# Patient Record
Sex: FEMALE | Race: BLACK OR AFRICAN AMERICAN | Employment: PART TIME | URBAN - METROPOLITAN AREA
[De-identification: names, ages, dates, MRNs, and addresses within clinical notes are randomized per-mention and may not be internally consistent; named-entity substitution may affect disease eponyms.]

---

## 2018-03-12 ENCOUNTER — OFFICE VISIT (OUTPATIENT)
Dept: GYNECOLOGY | Age: 58
End: 2018-03-12

## 2018-03-12 VITALS
BODY MASS INDEX: 30.76 KG/M2 | DIASTOLIC BLOOD PRESSURE: 88 MMHG | HEIGHT: 59 IN | WEIGHT: 152.6 LBS | SYSTOLIC BLOOD PRESSURE: 142 MMHG | HEART RATE: 85 BPM

## 2018-03-12 DIAGNOSIS — C54.1 ENDOMETRIAL CANCER (HCC): Primary | ICD-10-CM

## 2018-03-12 RX ORDER — SERTRALINE HYDROCHLORIDE 100 MG/1
TABLET, FILM COATED ORAL
COMMUNITY

## 2018-03-12 RX ORDER — PRAVASTATIN SODIUM 10 MG/1
TABLET ORAL
COMMUNITY

## 2018-03-12 NOTE — PROGRESS NOTES
[address] [place] [address] Mathias Moritz 773, [address] [facility]  P [phone]  F [phone]    Office Note  Patient ID:  Name:  Mar Jacques  MRN:  [id number]  :  1960/57 y.o. Date:  3/12/2018      HISTORY OF PRESENT ILLNESS:  Mar Jacques is a 56 y.o.  postmenopausal female who is being seen for a new diagnosis of endometrial cancer. She is referred by Dr. Silva Santa in [facility]. She presented for evaluation of postmenopausal bleeding. An ultrasound demonstrated a thickened endometrium. A subsequent biopsy revealed a high-grade endometrial carcinoma with both papillary serous and clear cell features. I have been asked to see her in consultation for further evaluation and management. She reports a two month history of bleeding. ROS:   and GI review:  Negative  Cardiopulmonary review:  Negative   Musculoskeletal:  Negative    A comprehensive review of systems was negative except for that written in the History of Present Illness. , 10 point ROS      OB/GYN ROS:    Vaginal delivery x 3  There is no history of significant gyn problems or procedures. Patient denies any abnormal bleeding or vaginal discharge. Problem List:  Patient Active Problem List    Diagnosis Date Noted    [facility]     PMH:  Past Medical History:   Diagnosis Date    High cholesterol     Kidney stones       PSH:  Past Surgical History:   Procedure Laterality Date    HX BREAST LUMPECTOMY      bilateral lump removal    HX TONSILLECTOMY        Social History:  Social History   Substance Use Topics    Smoking status: Never Smoker    Smokeless tobacco: Never Used    Alcohol use Not on file      Family History:  Family History   Problem Relation Age of Onset    Prostate Cancer Father       Medications: (reviewed)  Current Outpatient Prescriptions   Medication Sig    pravastatin (PRAVACHOL) 10 mg tablet Take  by mouth nightly.  sertraline (ZOLOFT) 100 mg tablet Take  by mouth daily. No current facility-administered medications for this visit. Allergies: (reviewed)  No Known Allergies       OBJECTIVE:    Physical Exam:  VITAL SIGNS: Vitals:    03/12/18 1303   BP: 142/88   Pulse: 85   Weight: 152 lb 9.6 oz (69.2 kg)   Height: 4' 11\" (1.499 m)     Body mass index is 30.82 kg/(m^2). GENERAL RUSS: Conversant, alert, oriented. No acute distress. HEENT: HEENT. No thyroid enlargement. No JVD. Neck: Supple without restrictions. RESPIRATORY: Clear to auscultation and percussion to the bases. No CVAT. CARDIOVASC: RRR without murmur/rub. GASTROINT: soft, non-tender, without masses or organomegaly   MUSCULOSKEL: no joint tenderness, deformity or swelling   EXTREMITIES: extremities normal, atraumatic, no cyanosis or edema   PELVIC: Vulva and vagina appear normal. Bimanual exam reveals normal uterus and adnexa. RECTAL: Deferred   GURMEET SURVEY: No suspicious lymphadenopathy or edema noted. NEURO: Grossly intact. No acute deficit. Imaging: Outside pelvic ultrasound from [facility] reviewed. Uterus measured 9.1 x 3.9 x 4.0 cm. Focal echogenic thickening of the mid to fundal aspect of the endometrial echo complex measuring 14 mm. Trace cystic focus. Ovaries not visualized. No free fluid. No adnexal masses. IMPRESSION/PLAN:  Leo Ruiz is a 56 y.o. female with a working diagnosis of endometrial carcinoma, high-grade with both papillary serous and clear cell features present. I reviewed with Leo Ruiz her medical records, physical exam, and review of symptoms. I explained to her and her family the standard of care for managing endometrial cancer and I discussed the particular subtypes noted on her endometrial biopsy. I have recommended robotic assisted laparoscopic hysterectomy with bilateral salpingooophorectomy, sentinel pelvic lymph node dissection, and omentectomy.   She was counseled on the risks, benefits, indications, and alternatives of surgery. Her questions were answered and she wishes to proceed as planned. We will check a CT of the chest/abdomen/pelvis prior to surgery to rule out metastatic disease. We will also check a CA-125.           Signed By: Conda Dakins., MD     3/12/2018/1:03 PM

## 2018-03-12 NOTE — LETTER
3/12/2018 1:41 PM 
 
Patient:  Lucien Connors YOB: 1960 Date of Visit: 3/12/2018 Dear Dr. Sweetie Barroso VIA Facsimile: [phone] 
 : Thank you for referring Ms. Lucien Connors to me for evaluation/treatment. Below are the relevant portions of my assessment and plan of care. [address], [address] [facility], [address] 
P [phone]  F [phone] Office Note Patient ID: 
Name:  Lucien Connors MRN:  [id number] :  1960/57 y.o. Date:  3/12/2018 HISTORY OF PRESENT ILLNESS: 
Lucien Connors is a 56 y.o.  postmenopausal female who is being seen for a new diagnosis of endometrial cancer. She is referred by Dr. Sweetie Barroso in [facility]. She presented for evaluation of postmenopausal bleeding. An ultrasound demonstrated a thickened endometrium. A subsequent biopsy revealed a high-grade endometrial carcinoma with both papillary serous and clear cell features. I have been asked to see her in consultation for further evaluation and management. She reports a two month history of bleeding. ROS: 
 and GI review:  Negative Cardiopulmonary review:  Negative Musculoskeletal:  Negative A comprehensive review of systems was negative except for that written in the History of Present Illness. , 10 point ROS 
 
 
OB/GYN ROS: 
 Vaginal delivery x 3 There is no history of significant gyn problems or procedures. Patient denies any abnormal bleeding or vaginal discharge. Problem List: 
Patient Active Problem List  
 Diagnosis Date Noted  Endometrial cancer ([address] 2018 PMH: 
Past Medical History:  
Diagnosis Date  High cholesterol  Kidney stones PSH: 
Past Surgical History:  
Procedure Laterality Date  HX BREAST LUMPECTOMY    
 bilateral lump removal  
 HX TONSILLECTOMY Social History: 
Social History Substance Use Topics  Smoking status: Never Smoker  Smokeless tobacco: Never Used  Alcohol use Not on file Family History: 
Family History Problem Relation Age of Onset  Prostate Cancer Father Medications: (reviewed) Current Outpatient Prescriptions Medication Sig  pravastatin (PRAVACHOL) 10 mg tablet Take  by mouth nightly.  sertraline (ZOLOFT) 100 mg tablet Take  by mouth daily. No current facility-administered medications for this visit. Allergies: (reviewed) No Known Allergies OBJECTIVE: 
 
Physical Exam: VITAL SIGNS: Vitals:  
 03/12/18 1303 BP: 142/88 Pulse: 85 Weight: 152 lb 9.6 oz (69.2 kg) Height: 4' 11\" (1.499 m) Body mass index is 30.82 kg/(m^2). GENERAL RUSS: Conversant, alert, oriented. No acute distress. HEENT: HEENT. No thyroid enlargement. No JVD. Neck: Supple without restrictions. RESPIRATORY: Clear to auscultation and percussion to the bases. No CVAT. CARDIOVASC: RRR without murmur/rub. GASTROINT: soft, non-tender, without masses or organomegaly MUSCULOSKEL: no joint tenderness, deformity or swelling EXTREMITIES: extremities normal, atraumatic, no cyanosis or edema PELVIC: Vulva and vagina appear normal. Bimanual exam reveals normal uterus and adnexa. RECTAL: Deferred GURMEET SURVEY: No suspicious lymphadenopathy or edema noted. NEURO: Grossly intact. No acute deficit. Imaging: Outside pelvic ultrasound from [address] reviewed. Uterus measured 9.1 x 3.9 x 4.0 cm. Focal echogenic thickening of the mid to fundal aspect of the endometrial echo complex measuring 14 mm. Trace cystic focus. Ovaries not visualized. No free fluid. No adnexal masses. IMPRESSION/PLAN: 
Mahsa Lynch is a 56 y.o. female with a working diagnosis of endometrial carcinoma, high-grade with both papillary serous and clear cell features present.   I reviewed with Mahsa Lynch her medical records, physical exam, and review of symptoms. I explained to her and her family the standard of care for managing endometrial cancer and I discussed the particular subtypes noted on her endometrial biopsy. I have recommended robotic assisted laparoscopic hysterectomy with bilateral salpingooophorectomy, sentinel pelvic lymph node dissection, and omentectomy. She was counseled on the risks, benefits, indications, and alternatives of surgery. Her questions were answered and she wishes to proceed as planned. We will check a CT of the chest/abdomen/pelvis prior to surgery to rule out metastatic disease. We will also check a CA-125. Signed By: Cherry Martínez MD   
 3/12/2018/1:03 PM  
 
 
New pt, referred by Dr. Tawny Najjar, Endometrial Cancer, Endo bx on 3/5/18 If you have questions, please do not hesitate to call me. I look forward to following Ms. Tito Pimentel along with you.  
 
 
 
Sincerely, 
 
 
Cherry Martínez MD

## 2018-03-16 ENCOUNTER — HOSPITAL ENCOUNTER (OUTPATIENT)
Dept: PREADMISSION TESTING | Age: 58
Discharge: HOME | End: 2018-03-16
Attending: OBSTETRICS & GYNECOLOGY
Payer: OTHER GOVERNMENT

## 2018-03-16 ENCOUNTER — HOSPITAL ENCOUNTER (OUTPATIENT)
Dept: CT IMAGING | Age: 58
Discharge: HOME | End: 2018-03-16
Attending: OBSTETRICS & GYNECOLOGY
Payer: OTHER GOVERNMENT

## 2018-03-16 VITALS
HEIGHT: 59 IN | WEIGHT: 151.38 LBS | DIASTOLIC BLOOD PRESSURE: 85 MMHG | TEMPERATURE: 98.1 F | RESPIRATION RATE: 18 BRPM | BODY MASS INDEX: 30.52 KG/M2 | SYSTOLIC BLOOD PRESSURE: 140 MMHG | HEART RATE: 64 BPM

## 2018-03-16 DIAGNOSIS — C54.1 ENDOMETRIAL CANCER (HCC): ICD-10-CM

## 2018-03-16 LAB
ALBUMIN SERPL-MCNC: 4.1 G/DL (ref 3.5–5)
ALBUMIN/GLOB SERPL: 1.2 {RATIO} (ref 1.1–2.2)
ALP SERPL-CCNC: 119 U/L (ref 45–117)
ALT SERPL-CCNC: 24 U/L (ref 12–78)
ANION GAP SERPL CALC-SCNC: 8 MMOL/L (ref 5–15)
AST SERPL-CCNC: 18 U/L (ref 15–37)
BASOPHILS # BLD: 0 K/UL (ref 0–0.1)
BASOPHILS NFR BLD: 0 % (ref 0–1)
BILIRUB SERPL-MCNC: 0.3 MG/DL (ref 0.2–1)
BUN SERPL-MCNC: 13 MG/DL (ref 6–20)
BUN/CREAT SERPL: 16 (ref 12–20)
CALCIUM SERPL-MCNC: 9.3 MG/DL (ref 8.5–10.1)
CHLORIDE SERPL-SCNC: 107 MMOL/L (ref 97–108)
CO2 SERPL-SCNC: 25 MMOL/L (ref 21–32)
CREAT SERPL-MCNC: 0.79 MG/DL (ref 0.55–1.02)
DIFFERENTIAL METHOD BLD: NORMAL
EOSINOPHIL # BLD: 0 K/UL (ref 0–0.4)
EOSINOPHIL NFR BLD: 0 % (ref 0–7)
ERYTHROCYTE [DISTWIDTH] IN BLOOD BY AUTOMATED COUNT: 14.5 % (ref 11.5–14.5)
GLOBULIN SER CALC-MCNC: 3.5 G/DL (ref 2–4)
GLUCOSE SERPL-MCNC: 83 MG/DL (ref 65–100)
HCT VFR BLD AUTO: 39.8 % (ref 35–47)
HGB BLD-MCNC: 13.7 G/DL (ref 11.5–16)
IMM GRANULOCYTES # BLD: 0 K/UL (ref 0–0.04)
IMM GRANULOCYTES NFR BLD AUTO: 0 % (ref 0–0.5)
LYMPHOCYTES # BLD: 1.8 K/UL (ref 0.8–3.5)
LYMPHOCYTES NFR BLD: 22 % (ref 12–49)
MCH RBC QN AUTO: 28.8 PG (ref 26–34)
MCHC RBC AUTO-ENTMCNC: 34.4 G/DL (ref 30–36.5)
MCV RBC AUTO: 83.8 FL (ref 80–99)
MONOCYTES # BLD: 0.5 K/UL (ref 0–1)
MONOCYTES NFR BLD: 7 % (ref 5–13)
NEUTS SEG # BLD: 5.9 K/UL (ref 1.8–8)
NEUTS SEG NFR BLD: 71 % (ref 32–75)
NRBC # BLD: 0 K/UL (ref 0–0.01)
NRBC BLD-RTO: 0 PER 100 WBC
PLATELET # BLD AUTO: 252 K/UL (ref 150–400)
PMV BLD AUTO: 10.3 FL (ref 8.9–12.9)
POTASSIUM SERPL-SCNC: 4.2 MMOL/L (ref 3.5–5.1)
PROT SERPL-MCNC: 7.6 G/DL (ref 6.4–8.2)
RBC # BLD AUTO: 4.75 M/UL (ref 3.8–5.2)
SODIUM SERPL-SCNC: 140 MMOL/L (ref 136–145)
WBC # BLD AUTO: 8.3 K/UL (ref 3.6–11)

## 2018-03-16 PROCEDURE — 36415 COLL VENOUS BLD VENIPUNCTURE: CPT | Performed by: OBSTETRICS & GYNECOLOGY

## 2018-03-16 PROCEDURE — 85025 COMPLETE CBC W/AUTO DIFF WBC: CPT | Performed by: OBSTETRICS & GYNECOLOGY

## 2018-03-16 PROCEDURE — 80053 COMPREHEN METABOLIC PANEL: CPT | Performed by: OBSTETRICS & GYNECOLOGY

## 2018-03-16 PROCEDURE — 71260 CT THORAX DX C+: CPT

## 2018-03-16 PROCEDURE — 93005 ELECTROCARDIOGRAM TRACING: CPT

## 2018-03-16 PROCEDURE — 74177 CT ABD & PELVIS W/CONTRAST: CPT

## 2018-03-16 PROCEDURE — 74011636320 HC RX REV CODE- 636/320: Performed by: OBSTETRICS & GYNECOLOGY

## 2018-03-16 PROCEDURE — 74011000258 HC RX REV CODE- 258: Performed by: OBSTETRICS & GYNECOLOGY

## 2018-03-16 PROCEDURE — 86304 IMMUNOASSAY TUMOR CA 125: CPT | Performed by: OBSTETRICS & GYNECOLOGY

## 2018-03-16 RX ORDER — SODIUM CHLORIDE 0.9 % (FLUSH) 0.9 %
10 SYRINGE (ML) INJECTION
Status: COMPLETED | OUTPATIENT
Start: 2018-03-16 | End: 2018-03-16

## 2018-03-16 RX ORDER — LANOLIN ALCOHOL/MO/W.PET/CERES
400 CREAM (GRAM) TOPICAL
COMMUNITY

## 2018-03-16 RX ADMIN — IOPAMIDOL 100 ML: 755 INJECTION, SOLUTION INTRAVENOUS at 10:00

## 2018-03-16 RX ADMIN — Medication 10 ML: at 10:00

## 2018-03-16 RX ADMIN — SODIUM CHLORIDE 100 ML: 900 INJECTION, SOLUTION INTRAVENOUS at 10:00

## 2018-03-16 NOTE — PERIOP NOTES
PAT INTERVIEW COMPLETED WITH PT.  INFECTION PREVENTION INFORMATION GIVEN TO PT.  PT WAS GIVEN THE OPPORTUNITY TO ASK ADDITIONAL QUESTIONS.     WIPES AND DIRECTIONS GIVEN TO PT    REFERRAL TO [facility]ST[facility]SE PLACED IN CC

## 2018-03-17 LAB
ATRIAL RATE: 60 BPM
CALCULATED P AXIS, ECG09: 24 DEGREES
CALCULATED R AXIS, ECG10: 6 DEGREES
CALCULATED T AXIS, ECG11: 22 DEGREES
DIAGNOSIS, 93000: NORMAL
P-R INTERVAL, ECG05: 142 MS
Q-T INTERVAL, ECG07: 388 MS
QRS DURATION, ECG06: 74 MS
QTC CALCULATION (BEZET), ECG08: 388 MS
VENTRICULAR RATE, ECG03: 60 BPM

## 2018-03-19 LAB — CANCER AG125 SERPL-ACNC: 7 U/ML (ref 0–30)

## 2018-03-21 ENCOUNTER — ANESTHESIA EVENT (OUTPATIENT)
Dept: SURGERY | Age: 58
End: 2018-03-21
Payer: OTHER GOVERNMENT

## 2018-03-21 ENCOUNTER — ANESTHESIA (OUTPATIENT)
Dept: SURGERY | Age: 58
End: 2018-03-21
Payer: OTHER GOVERNMENT

## 2018-03-21 ENCOUNTER — HOSPITAL ENCOUNTER (OUTPATIENT)
Age: 58
Setting detail: OBSERVATION
Discharge: HOME | End: 2018-03-22
Attending: OBSTETRICS & GYNECOLOGY | Admitting: OBSTETRICS & GYNECOLOGY
Payer: OTHER GOVERNMENT

## 2018-03-21 DIAGNOSIS — C54.1 ENDOMETRIAL CANCER (HCC): Primary | ICD-10-CM

## 2018-03-21 PROCEDURE — 74011000258 HC RX REV CODE- 258: Performed by: OBSTETRICS & GYNECOLOGY

## 2018-03-21 PROCEDURE — 74011000254 HC RX REV CODE- 254: Performed by: OBSTETRICS & GYNECOLOGY

## 2018-03-21 PROCEDURE — 77030026438 HC STYL ET INTUB CARD -A: Performed by: ANESTHESIOLOGY

## 2018-03-21 PROCEDURE — 77030013079 HC BLNKT BAIR HGGR 3M -A: Performed by: ANESTHESIOLOGY

## 2018-03-21 PROCEDURE — 77030020782 HC GWN BAIR PAWS FLX 3M -B

## 2018-03-21 PROCEDURE — 74011250636 HC RX REV CODE- 250/636: Performed by: OBSTETRICS & GYNECOLOGY

## 2018-03-21 PROCEDURE — 74011250637 HC RX REV CODE- 250/637: Performed by: OBSTETRICS & GYNECOLOGY

## 2018-03-21 PROCEDURE — 74011000250 HC RX REV CODE- 250

## 2018-03-21 PROCEDURE — 77030020263 HC SOL INJ SOD CL0.9% LFCR 1000ML: Performed by: OBSTETRICS & GYNECOLOGY

## 2018-03-21 PROCEDURE — 77030035277 HC OBTRTR BLDELSS DISP INTU -B: Performed by: OBSTETRICS & GYNECOLOGY

## 2018-03-21 PROCEDURE — 77030009852 HC PCH RTVR ENDOSC COVD -B: Performed by: OBSTETRICS & GYNECOLOGY

## 2018-03-21 PROCEDURE — 76060000035 HC ANESTHESIA 2 TO 2.5 HR: Performed by: OBSTETRICS & GYNECOLOGY

## 2018-03-21 PROCEDURE — 76010000876 HC OR TIME 2 TO 2.5HR INTENSV - TIER 2: Performed by: OBSTETRICS & GYNECOLOGY

## 2018-03-21 PROCEDURE — 77030002933 HC SUT MCRYL J&J -A: Performed by: OBSTETRICS & GYNECOLOGY

## 2018-03-21 PROCEDURE — 88342 IMHCHEM/IMCYTCHM 1ST ANTB: CPT | Performed by: OBSTETRICS & GYNECOLOGY

## 2018-03-21 PROCEDURE — 77030018836 HC SOL IRR NACL ICUM -A: Performed by: OBSTETRICS & GYNECOLOGY

## 2018-03-21 PROCEDURE — 74011250636 HC RX REV CODE- 250/636

## 2018-03-21 PROCEDURE — 77030003666 HC NDL SPINAL BD -A: Performed by: OBSTETRICS & GYNECOLOGY

## 2018-03-21 PROCEDURE — 77030002934 HC SUT MCRYL J&J -B: Performed by: OBSTETRICS & GYNECOLOGY

## 2018-03-21 PROCEDURE — 77030019908 HC STETH ESOPH SIMS -A: Performed by: ANESTHESIOLOGY

## 2018-03-21 PROCEDURE — 77030010507 HC ADH SKN DERMBND J&J -B: Performed by: OBSTETRICS & GYNECOLOGY

## 2018-03-21 PROCEDURE — 77030032490 HC SLV COMPR SCD KNE COVD -B: Performed by: OBSTETRICS & GYNECOLOGY

## 2018-03-21 PROCEDURE — 77030008756 HC TU IRR SUC STRY -B: Performed by: OBSTETRICS & GYNECOLOGY

## 2018-03-21 PROCEDURE — 77030008684 HC TU ET CUF COVD -B: Performed by: ANESTHESIOLOGY

## 2018-03-21 PROCEDURE — 88305 TISSUE EXAM BY PATHOLOGIST: CPT | Performed by: OBSTETRICS & GYNECOLOGY

## 2018-03-21 PROCEDURE — 88341 IMHCHEM/IMCYTCHM EA ADD ANTB: CPT | Performed by: OBSTETRICS & GYNECOLOGY

## 2018-03-21 PROCEDURE — 99218 HC RM OBSERVATION: CPT

## 2018-03-21 PROCEDURE — 77030008771 HC TU NG SALEM SUMP -A: Performed by: ANESTHESIOLOGY

## 2018-03-21 PROCEDURE — 77030020703 HC SEAL CANN DISP INTU -B: Performed by: OBSTETRICS & GYNECOLOGY

## 2018-03-21 PROCEDURE — 74011000250 HC RX REV CODE- 250: Performed by: OBSTETRICS & GYNECOLOGY

## 2018-03-21 PROCEDURE — 77030005518 HC CATH URETH FOL 2W BARD -B: Performed by: OBSTETRICS & GYNECOLOGY

## 2018-03-21 PROCEDURE — 88309 TISSUE EXAM BY PATHOLOGIST: CPT | Performed by: OBSTETRICS & GYNECOLOGY

## 2018-03-21 PROCEDURE — 77030016151 HC PROTCTR LNS DFOG COVD -B: Performed by: OBSTETRICS & GYNECOLOGY

## 2018-03-21 PROCEDURE — 77030018778 HC MANIP UTER VCAR CNMD -B: Performed by: OBSTETRICS & GYNECOLOGY

## 2018-03-21 PROCEDURE — 74011250636 HC RX REV CODE- 250/636: Performed by: ANESTHESIOLOGY

## 2018-03-21 PROCEDURE — 76210000006 HC OR PH I REC 0.5 TO 1 HR: Performed by: OBSTETRICS & GYNECOLOGY

## 2018-03-21 PROCEDURE — 88307 TISSUE EXAM BY PATHOLOGIST: CPT | Performed by: OBSTETRICS & GYNECOLOGY

## 2018-03-21 PROCEDURE — 77030011640 HC PAD GRND REM COVD -A: Performed by: OBSTETRICS & GYNECOLOGY

## 2018-03-21 PROCEDURE — 88112 CYTOPATH CELL ENHANCE TECH: CPT | Performed by: OBSTETRICS & GYNECOLOGY

## 2018-03-21 PROCEDURE — 77030031492 HC PRT ACC BLNT AIRSEAL CNMD -B: Performed by: OBSTETRICS & GYNECOLOGY

## 2018-03-21 PROCEDURE — 77030003580 HC NDL INSUF VERES J&J -B: Performed by: OBSTETRICS & GYNECOLOGY

## 2018-03-21 RX ORDER — HYDROMORPHONE HYDROCHLORIDE 1 MG/ML
1 INJECTION, SOLUTION INTRAMUSCULAR; INTRAVENOUS; SUBCUTANEOUS
Status: DISCONTINUED | OUTPATIENT
Start: 2018-03-21 | End: 2018-03-22 | Stop reason: HOSPADM

## 2018-03-21 RX ORDER — SODIUM CHLORIDE 0.9 % (FLUSH) 0.9 %
5-10 SYRINGE (ML) INJECTION AS NEEDED
Status: DISCONTINUED | OUTPATIENT
Start: 2018-03-21 | End: 2018-03-21 | Stop reason: HOSPADM

## 2018-03-21 RX ORDER — SODIUM CHLORIDE, SODIUM LACTATE, POTASSIUM CHLORIDE, CALCIUM CHLORIDE 600; 310; 30; 20 MG/100ML; MG/100ML; MG/100ML; MG/100ML
INJECTION, SOLUTION INTRAVENOUS
Status: DISCONTINUED | OUTPATIENT
Start: 2018-03-21 | End: 2018-03-21 | Stop reason: HOSPADM

## 2018-03-21 RX ORDER — LIDOCAINE HYDROCHLORIDE 20 MG/ML
INJECTION, SOLUTION EPIDURAL; INFILTRATION; INTRACAUDAL; PERINEURAL AS NEEDED
Status: DISCONTINUED | OUTPATIENT
Start: 2018-03-21 | End: 2018-03-21 | Stop reason: HOSPADM

## 2018-03-21 RX ORDER — GLYCOPYRROLATE 0.2 MG/ML
INJECTION INTRAMUSCULAR; INTRAVENOUS AS NEEDED
Status: DISCONTINUED | OUTPATIENT
Start: 2018-03-21 | End: 2018-03-21 | Stop reason: HOSPADM

## 2018-03-21 RX ORDER — MIDAZOLAM HYDROCHLORIDE 1 MG/ML
INJECTION, SOLUTION INTRAMUSCULAR; INTRAVENOUS AS NEEDED
Status: DISCONTINUED | OUTPATIENT
Start: 2018-03-21 | End: 2018-03-21 | Stop reason: HOSPADM

## 2018-03-21 RX ORDER — PROCHLORPERAZINE EDISYLATE 5 MG/ML
10 INJECTION INTRAMUSCULAR; INTRAVENOUS
Status: DISCONTINUED | OUTPATIENT
Start: 2018-03-21 | End: 2018-03-21 | Stop reason: SDUPTHER

## 2018-03-21 RX ORDER — SUCCINYLCHOLINE CHLORIDE 20 MG/ML
INJECTION INTRAMUSCULAR; INTRAVENOUS AS NEEDED
Status: DISCONTINUED | OUTPATIENT
Start: 2018-03-21 | End: 2018-03-21 | Stop reason: HOSPADM

## 2018-03-21 RX ORDER — ROCURONIUM BROMIDE 10 MG/ML
INJECTION, SOLUTION INTRAVENOUS AS NEEDED
Status: DISCONTINUED | OUTPATIENT
Start: 2018-03-21 | End: 2018-03-21 | Stop reason: HOSPADM

## 2018-03-21 RX ORDER — HYDROMORPHONE HYDROCHLORIDE 2 MG/ML
INJECTION, SOLUTION INTRAMUSCULAR; INTRAVENOUS; SUBCUTANEOUS AS NEEDED
Status: DISCONTINUED | OUTPATIENT
Start: 2018-03-21 | End: 2018-03-21 | Stop reason: HOSPADM

## 2018-03-21 RX ORDER — MIDAZOLAM HYDROCHLORIDE 1 MG/ML
1 INJECTION, SOLUTION INTRAMUSCULAR; INTRAVENOUS AS NEEDED
Status: DISCONTINUED | OUTPATIENT
Start: 2018-03-21 | End: 2018-03-21 | Stop reason: HOSPADM

## 2018-03-21 RX ORDER — HYDROMORPHONE HYDROCHLORIDE 1 MG/ML
INJECTION, SOLUTION INTRAMUSCULAR; INTRAVENOUS; SUBCUTANEOUS
Status: COMPLETED
Start: 2018-03-21 | End: 2018-03-21

## 2018-03-21 RX ORDER — SODIUM CHLORIDE 9 MG/ML
125 INJECTION, SOLUTION INTRAVENOUS CONTINUOUS
Status: DISCONTINUED | OUTPATIENT
Start: 2018-03-21 | End: 2018-03-22 | Stop reason: HOSPADM

## 2018-03-21 RX ORDER — SODIUM CHLORIDE, SODIUM LACTATE, POTASSIUM CHLORIDE, CALCIUM CHLORIDE 600; 310; 30; 20 MG/100ML; MG/100ML; MG/100ML; MG/100ML
100 INJECTION, SOLUTION INTRAVENOUS CONTINUOUS
Status: DISCONTINUED | OUTPATIENT
Start: 2018-03-21 | End: 2018-03-21 | Stop reason: HOSPADM

## 2018-03-21 RX ORDER — DIPHENHYDRAMINE HYDROCHLORIDE 50 MG/ML
12.5 INJECTION, SOLUTION INTRAMUSCULAR; INTRAVENOUS
Status: DISCONTINUED | OUTPATIENT
Start: 2018-03-21 | End: 2018-03-22 | Stop reason: HOSPADM

## 2018-03-21 RX ORDER — INDOCYANINE GREEN AND WATER 25 MG
KIT INJECTION AS NEEDED
Status: DISCONTINUED | OUTPATIENT
Start: 2018-03-21 | End: 2018-03-21 | Stop reason: HOSPADM

## 2018-03-21 RX ORDER — FENTANYL CITRATE 50 UG/ML
25 INJECTION, SOLUTION INTRAMUSCULAR; INTRAVENOUS
Status: COMPLETED | OUTPATIENT
Start: 2018-03-21 | End: 2018-03-21

## 2018-03-21 RX ORDER — SODIUM CHLORIDE 0.9 % (FLUSH) 0.9 %
5-10 SYRINGE (ML) INJECTION AS NEEDED
Status: DISCONTINUED | OUTPATIENT
Start: 2018-03-21 | End: 2018-03-22 | Stop reason: HOSPADM

## 2018-03-21 RX ORDER — ONDANSETRON 2 MG/ML
INJECTION INTRAMUSCULAR; INTRAVENOUS AS NEEDED
Status: DISCONTINUED | OUTPATIENT
Start: 2018-03-21 | End: 2018-03-21 | Stop reason: HOSPADM

## 2018-03-21 RX ORDER — NEOSTIGMINE METHYLSULFATE 1 MG/ML
INJECTION INTRAVENOUS AS NEEDED
Status: DISCONTINUED | OUTPATIENT
Start: 2018-03-21 | End: 2018-03-21 | Stop reason: HOSPADM

## 2018-03-21 RX ORDER — SERTRALINE HYDROCHLORIDE 50 MG/1
100 TABLET, FILM COATED ORAL
Status: DISCONTINUED | OUTPATIENT
Start: 2018-03-22 | End: 2018-03-22 | Stop reason: HOSPADM

## 2018-03-21 RX ORDER — ONDANSETRON 2 MG/ML
4 INJECTION INTRAMUSCULAR; INTRAVENOUS AS NEEDED
Status: DISCONTINUED | OUTPATIENT
Start: 2018-03-21 | End: 2018-03-21 | Stop reason: HOSPADM

## 2018-03-21 RX ORDER — OXYCODONE AND ACETAMINOPHEN 5; 325 MG/1; MG/1
1 TABLET ORAL
Status: DISCONTINUED | OUTPATIENT
Start: 2018-03-21 | End: 2018-03-22 | Stop reason: HOSPADM

## 2018-03-21 RX ORDER — PHENYLEPHRINE HCL IN 0.9% NACL 0.4MG/10ML
SYRINGE (ML) INTRAVENOUS AS NEEDED
Status: DISCONTINUED | OUTPATIENT
Start: 2018-03-21 | End: 2018-03-21 | Stop reason: HOSPADM

## 2018-03-21 RX ORDER — EPHEDRINE SULFATE 50 MG/ML
INJECTION, SOLUTION INTRAVENOUS AS NEEDED
Status: DISCONTINUED | OUTPATIENT
Start: 2018-03-21 | End: 2018-03-21 | Stop reason: HOSPADM

## 2018-03-21 RX ORDER — DIPHENHYDRAMINE HYDROCHLORIDE 50 MG/ML
12.5 INJECTION, SOLUTION INTRAMUSCULAR; INTRAVENOUS AS NEEDED
Status: DISCONTINUED | OUTPATIENT
Start: 2018-03-21 | End: 2018-03-21 | Stop reason: HOSPADM

## 2018-03-21 RX ORDER — OXYCODONE HYDROCHLORIDE 5 MG/1
5 TABLET ORAL AS NEEDED
Status: DISCONTINUED | OUTPATIENT
Start: 2018-03-21 | End: 2018-03-21 | Stop reason: HOSPADM

## 2018-03-21 RX ORDER — LORAZEPAM 2 MG/ML
1 INJECTION INTRAMUSCULAR
Status: DISCONTINUED | OUTPATIENT
Start: 2018-03-21 | End: 2018-03-22 | Stop reason: HOSPADM

## 2018-03-21 RX ORDER — ROPIVACAINE HYDROCHLORIDE 5 MG/ML
30 INJECTION, SOLUTION EPIDURAL; INFILTRATION; PERINEURAL AS NEEDED
Status: DISCONTINUED | OUTPATIENT
Start: 2018-03-21 | End: 2018-03-21 | Stop reason: HOSPADM

## 2018-03-21 RX ORDER — LIDOCAINE HYDROCHLORIDE 10 MG/ML
0.1 INJECTION, SOLUTION EPIDURAL; INFILTRATION; INTRACAUDAL; PERINEURAL AS NEEDED
Status: DISCONTINUED | OUTPATIENT
Start: 2018-03-21 | End: 2018-03-21 | Stop reason: HOSPADM

## 2018-03-21 RX ORDER — PROPOFOL 10 MG/ML
INJECTION, EMULSION INTRAVENOUS AS NEEDED
Status: DISCONTINUED | OUTPATIENT
Start: 2018-03-21 | End: 2018-03-21 | Stop reason: HOSPADM

## 2018-03-21 RX ORDER — SODIUM CHLORIDE, SODIUM LACTATE, POTASSIUM CHLORIDE, CALCIUM CHLORIDE 600; 310; 30; 20 MG/100ML; MG/100ML; MG/100ML; MG/100ML
25 INJECTION, SOLUTION INTRAVENOUS CONTINUOUS
Status: DISCONTINUED | OUTPATIENT
Start: 2018-03-21 | End: 2018-03-21 | Stop reason: HOSPADM

## 2018-03-21 RX ORDER — CEFAZOLIN SODIUM 2 G/50ML
2 SOLUTION INTRAVENOUS EVERY 8 HOURS
Status: COMPLETED | OUTPATIENT
Start: 2018-03-21 | End: 2018-03-22

## 2018-03-21 RX ORDER — MORPHINE SULFATE 10 MG/ML
2 INJECTION, SOLUTION INTRAMUSCULAR; INTRAVENOUS
Status: DISCONTINUED | OUTPATIENT
Start: 2018-03-21 | End: 2018-03-21 | Stop reason: HOSPADM

## 2018-03-21 RX ORDER — MIDAZOLAM HYDROCHLORIDE 1 MG/ML
0.5 INJECTION, SOLUTION INTRAMUSCULAR; INTRAVENOUS
Status: DISCONTINUED | OUTPATIENT
Start: 2018-03-21 | End: 2018-03-21 | Stop reason: HOSPADM

## 2018-03-21 RX ORDER — DEXAMETHASONE SODIUM PHOSPHATE 4 MG/ML
INJECTION, SOLUTION INTRA-ARTICULAR; INTRALESIONAL; INTRAMUSCULAR; INTRAVENOUS; SOFT TISSUE AS NEEDED
Status: DISCONTINUED | OUTPATIENT
Start: 2018-03-21 | End: 2018-03-21 | Stop reason: HOSPADM

## 2018-03-21 RX ORDER — SODIUM CHLORIDE 0.9 % (FLUSH) 0.9 %
5-10 SYRINGE (ML) INJECTION EVERY 8 HOURS
Status: DISCONTINUED | OUTPATIENT
Start: 2018-03-21 | End: 2018-03-22 | Stop reason: HOSPADM

## 2018-03-21 RX ORDER — ONDANSETRON 2 MG/ML
4 INJECTION INTRAMUSCULAR; INTRAVENOUS
Status: DISCONTINUED | OUTPATIENT
Start: 2018-03-21 | End: 2018-03-22 | Stop reason: HOSPADM

## 2018-03-21 RX ORDER — KETOROLAC TROMETHAMINE 30 MG/ML
30 INJECTION, SOLUTION INTRAMUSCULAR; INTRAVENOUS
Status: DISCONTINUED | OUTPATIENT
Start: 2018-03-21 | End: 2018-03-22 | Stop reason: HOSPADM

## 2018-03-21 RX ORDER — FENTANYL CITRATE 50 UG/ML
INJECTION, SOLUTION INTRAMUSCULAR; INTRAVENOUS AS NEEDED
Status: DISCONTINUED | OUTPATIENT
Start: 2018-03-21 | End: 2018-03-21 | Stop reason: HOSPADM

## 2018-03-21 RX ORDER — SODIUM CHLORIDE 0.9 % (FLUSH) 0.9 %
5-10 SYRINGE (ML) INJECTION EVERY 8 HOURS
Status: DISCONTINUED | OUTPATIENT
Start: 2018-03-21 | End: 2018-03-21 | Stop reason: HOSPADM

## 2018-03-21 RX ORDER — FENTANYL CITRATE 50 UG/ML
50 INJECTION, SOLUTION INTRAMUSCULAR; INTRAVENOUS AS NEEDED
Status: DISCONTINUED | OUTPATIENT
Start: 2018-03-21 | End: 2018-03-21 | Stop reason: HOSPADM

## 2018-03-21 RX ORDER — SODIUM CHLORIDE 9 MG/ML
25 INJECTION, SOLUTION INTRAVENOUS CONTINUOUS
Status: DISCONTINUED | OUTPATIENT
Start: 2018-03-21 | End: 2018-03-21 | Stop reason: HOSPADM

## 2018-03-21 RX ADMIN — FENTANYL CITRATE 25 MCG: 50 INJECTION, SOLUTION INTRAMUSCULAR; INTRAVENOUS at 13:00

## 2018-03-21 RX ADMIN — SODIUM CHLORIDE, SODIUM LACTATE, POTASSIUM CHLORIDE, CALCIUM CHLORIDE: 600; 310; 30; 20 INJECTION, SOLUTION INTRAVENOUS at 10:38

## 2018-03-21 RX ADMIN — EPHEDRINE SULFATE 5 MG: 50 INJECTION, SOLUTION INTRAVENOUS at 11:04

## 2018-03-21 RX ADMIN — HYDROMORPHONE HYDROCHLORIDE 1 MG: 1 INJECTION, SOLUTION INTRAMUSCULAR; INTRAVENOUS; SUBCUTANEOUS at 18:50

## 2018-03-21 RX ADMIN — OXYCODONE AND ACETAMINOPHEN 1 TABLET: 5; 325 TABLET ORAL at 23:30

## 2018-03-21 RX ADMIN — ONDANSETRON 4 MG: 2 INJECTION INTRAMUSCULAR; INTRAVENOUS at 13:04

## 2018-03-21 RX ADMIN — FENTANYL CITRATE 50 MCG: 50 INJECTION, SOLUTION INTRAMUSCULAR; INTRAVENOUS at 10:45

## 2018-03-21 RX ADMIN — ROCURONIUM BROMIDE 5 MG: 10 INJECTION, SOLUTION INTRAVENOUS at 12:01

## 2018-03-21 RX ADMIN — CEFAZOLIN SODIUM 2 G: 2 SOLUTION INTRAVENOUS at 21:52

## 2018-03-21 RX ADMIN — HYDROMORPHONE HYDROCHLORIDE 1 MG: 1 INJECTION, SOLUTION INTRAMUSCULAR; INTRAVENOUS; SUBCUTANEOUS at 16:35

## 2018-03-21 RX ADMIN — FENTANYL CITRATE 25 MCG: 50 INJECTION, SOLUTION INTRAMUSCULAR; INTRAVENOUS at 13:30

## 2018-03-21 RX ADMIN — SODIUM CHLORIDE, SODIUM LACTATE, POTASSIUM CHLORIDE, AND CALCIUM CHLORIDE 25 ML/HR: 600; 310; 30; 20 INJECTION, SOLUTION INTRAVENOUS at 09:07

## 2018-03-21 RX ADMIN — ONDANSETRON 4 MG: 2 INJECTION INTRAMUSCULAR; INTRAVENOUS at 21:44

## 2018-03-21 RX ADMIN — ROCURONIUM BROMIDE 30 MG: 10 INJECTION, SOLUTION INTRAVENOUS at 10:55

## 2018-03-21 RX ADMIN — CEFOTETAN DISODIUM 2 G: 2 INJECTION, POWDER, FOR SOLUTION INTRAMUSCULAR; INTRAVENOUS at 10:53

## 2018-03-21 RX ADMIN — PROPOFOL 200 MG: 10 INJECTION, EMULSION INTRAVENOUS at 10:45

## 2018-03-21 RX ADMIN — DEXAMETHASONE SODIUM PHOSPHATE 4 MG: 4 INJECTION, SOLUTION INTRA-ARTICULAR; INTRALESIONAL; INTRAMUSCULAR; INTRAVENOUS; SOFT TISSUE at 10:59

## 2018-03-21 RX ADMIN — SODIUM CHLORIDE, SODIUM LACTATE, POTASSIUM CHLORIDE, CALCIUM CHLORIDE: 600; 310; 30; 20 INJECTION, SOLUTION INTRAVENOUS at 12:15

## 2018-03-21 RX ADMIN — Medication 80 MCG: at 10:58

## 2018-03-21 RX ADMIN — HYDROMORPHONE HYDROCHLORIDE 0.4 MG: 2 INJECTION, SOLUTION INTRAMUSCULAR; INTRAVENOUS; SUBCUTANEOUS at 11:07

## 2018-03-21 RX ADMIN — FENTANYL CITRATE 25 MCG: 50 INJECTION, SOLUTION INTRAMUSCULAR; INTRAVENOUS at 13:10

## 2018-03-21 RX ADMIN — MIDAZOLAM HYDROCHLORIDE 2 MG: 1 INJECTION, SOLUTION INTRAMUSCULAR; INTRAVENOUS at 10:38

## 2018-03-21 RX ADMIN — FENTANYL CITRATE 50 MCG: 50 INJECTION, SOLUTION INTRAMUSCULAR; INTRAVENOUS at 10:50

## 2018-03-21 RX ADMIN — SODIUM CHLORIDE 125 ML/HR: 900 INJECTION, SOLUTION INTRAVENOUS at 13:00

## 2018-03-21 RX ADMIN — ONDANSETRON 4 MG: 2 INJECTION INTRAMUSCULAR; INTRAVENOUS at 12:12

## 2018-03-21 RX ADMIN — ROCURONIUM BROMIDE 10 MG: 10 INJECTION, SOLUTION INTRAVENOUS at 11:43

## 2018-03-21 RX ADMIN — LIDOCAINE HYDROCHLORIDE 80 MG: 20 INJECTION, SOLUTION EPIDURAL; INFILTRATION; INTRACAUDAL; PERINEURAL at 10:45

## 2018-03-21 RX ADMIN — NEOSTIGMINE METHYLSULFATE 4 MG: 1 INJECTION INTRAVENOUS at 12:24

## 2018-03-21 RX ADMIN — SUCCINYLCHOLINE CHLORIDE 80 MG: 20 INJECTION INTRAMUSCULAR; INTRAVENOUS at 10:45

## 2018-03-21 RX ADMIN — FENTANYL CITRATE 25 MCG: 50 INJECTION, SOLUTION INTRAMUSCULAR; INTRAVENOUS at 13:20

## 2018-03-21 RX ADMIN — GLYCOPYRROLATE 0.6 MG: 0.2 INJECTION INTRAMUSCULAR; INTRAVENOUS at 12:24

## 2018-03-21 RX ADMIN — Medication 40 MCG: at 11:01

## 2018-03-21 RX ADMIN — EPHEDRINE SULFATE 10 MG: 50 INJECTION, SOLUTION INTRAVENOUS at 12:06

## 2018-03-21 RX ADMIN — HYDROMORPHONE HYDROCHLORIDE 0.4 MG: 2 INJECTION, SOLUTION INTRAMUSCULAR; INTRAVENOUS; SUBCUTANEOUS at 12:25

## 2018-03-21 RX ADMIN — Medication 60 MCG: at 11:38

## 2018-03-21 NOTE — ANESTHESIA PREPROCEDURE EVALUATION
Anesthetic History   No history of anesthetic complications            Review of Systems / Medical History  Patient summary reviewed, nursing notes reviewed and pertinent labs reviewed    Pulmonary  Within defined limits                 Neuro/Psych         Psychiatric history     Cardiovascular              Hyperlipidemia    Exercise tolerance: >4 METS     GI/Hepatic/Renal         Renal disease: stones       Endo/Other        Cancer (endometrial)     Other Findings            Physical Exam    Airway  Mallampati: II  TM Distance: 4 - 6 cm  Neck ROM: normal range of motion   Mouth opening: Normal     Cardiovascular  Regular rate and rhythm,  S1 and S2 normal,  no murmur, click, rub, or gallop  Rhythm: regular  Rate: normal         Dental  No notable dental hx       Pulmonary  Breath sounds clear to auscultation               Abdominal  GI exam deferred       Other Findings            Anesthetic Plan    ASA: 2  Anesthesia type: general          Induction: Intravenous  Anesthetic plan and risks discussed with: Patient

## 2018-03-21 NOTE — PROGRESS NOTES
7:34 PM  Primary Nurse Oriana Motta and Marcia, RN performed a dual skin assessment on this patient No impairment noted  Julio score is 20

## 2018-03-21 NOTE — PERIOP NOTES
Patient: Maximo Ahumada MRN: [id number]  SSN: [id number]   YOB: 1960  Age: 56 y.o. Sex: female     Patient is status post Procedure(s):  ROBOTIC ASSISTED TOTAL LAPAROSCOPIC HYSTERECTOMY, BILATERAL SALPINGO OOPHORECTOMY, PELVIC LYMPH NODE DISSECTION.     Surgeon(s) and Role:     * Osbaldo Evans MD - Primary                      Peripheral IV 03/21/18 Left Wrist (Active)   Dressing Status New 3/21/2018  8:51 AM   Dressing Type Transparent 3/21/2018  8:51 AM   Hub Color/Line Status Infusing 3/21/2018  8:51 AM          Orogastric Tube 03/21/18 (Active)      Airway - Endotracheal Tube 03/21/18 Oral (Active)                   Dressing/Packing:  Wound Abdomen Anterior-DRESSING TYPE: Topical skin adhesive/glue (03/21/18 1100)

## 2018-03-21 NOTE — PROGRESS NOTES
1910  TRANSFER - IN REPORT:    Verbal report received from PACU(name) on [facility]  being received from PAN[facility]A [place](unit) for routine post - op      Report consisted of patients Situation, Background, Assessment and   Recommendations(SBAR). Information from the following report(s) SBAR, Kardex, OR Summary, Intake/Output, MAR and Recent Results was reviewed with the receiving nurse. Opportunity for questions and clarification was provided. Assessment completed upon patients arrival to unit and care assumed.

## 2018-03-21 NOTE — IP AVS SNAPSHOT
[address] [facility] [address] 
[phone] Patient: Catherine Lou 
MRN: [id number] :1960 About your hospitalization You were admitted on:  2018 You last received care in the:  [id number] [place] [address] You were discharged on:  2018 Why you were hospitalized Your primary diagnosis was:  Not on File Your diagnoses also included:  Endometrial Cancer ([facility]) Follow-up Information Follow up With Details Comments Contact Info Cleveland Jeffries MD   Patient can only remember the practice name and not the physician Discharge Orders None A check jasmyn indicates which time of day the medication should be taken. My Medications START taking these medications Instructions Each Dose to Equal  
 Morning Noon Evening Bedtime  
 oxyCODONE-acetaminophen 5-325 mg per tablet Commonly known as:  PERCOCET Your last dose was: Your next dose is: Take 1 Tab by mouth every four (4) hours as needed. Max Daily Amount: 6 Tabs. 1 Tab CONTINUE taking these medications Instructions Each Dose to Equal  
 Morning Noon Evening Bedtime CALCIUM 600 + D 600-125 mg-unit Tab Generic drug:  calcium-cholecalciferol (d3) Your last dose was: Your next dose is: Take  by mouth two (2) times a day. magnesium oxide 400 mg tablet Commonly known as:  MAG-OX Your last dose was: Your next dose is: Take 400 mg by mouth daily (after lunch). 400 mg  
    
   
   
   
  
 pravastatin 10 mg tablet Commonly known as:  PRAVACHOL Your last dose was: Your next dose is: Take  by mouth nightly. sertraline 100 mg tablet Commonly known as:  ZOLOFT Your last dose was: Your next dose is: Take  by mouth daily (after lunch). Where to Get Your Medications Information on where to get these meds will be given to you by the nurse or doctor. ! Ask your nurse or doctor about these medications  
  oxyCODONE-acetaminophen 5-325 mg per tablet Discharge Instructions [address] [facility], [address] [place], [address]SON [phone]  F [phone] Patient Discharge Instructions Rachel Pollack / [id number] : 1960 Admitted 3/21/2018 Discharged: 3/22/2018 Take Home Medications No current facility-administered medications on file prior to encounter. Current Outpatient Prescriptions on File Prior to Encounter Medication Sig Dispense Refill  pravastatin (PRAVACHOL) 10 mg tablet Take  by mouth nightly.  sertraline (ZOLOFT) 100 mg tablet Take  by mouth daily (after lunch). · It is important that you take the medication exactly as they are prescribed. · Keep your medication in the bottles provided by the pharmacist and keep a list of the medication names, dosages, and times to be taken in your wallet. · Do not take other medications without consulting your doctor. What to do at [facility] Recommended diet: Regular Diet, stay hydrated. You should take a stool softener such as colace for constipation. If constipation persists for >24 hours you should take a dose of Milk of Magnesia. Call if your constipation persists. If you have had a hysterectomy or vaginal surgery you may experience vaginal spotting. This is acceptable. Should the bleeding require more that 4 pads a day please call our office. Nothing per vagina for 6-8 weeks. Recommended activity: No driving for 1 week and/or while on pain medication Walk at least 6 times per day Showers okay, no tub bathing/swimming for two weeks Activity as able, stairs okay. If you had a laparoscopic procedure, no lifting greater than 25 lbs for 3 weeks. If you had an open procedure, no heavy lifting greater than 15 lbs for 6 weeks. If you experience any of the following persisting symptoms: 
 
Nausea/vomiting, fever > 101 F, diarrhea/constipation, increasing pain despite medication, increasing vaginal discharge or any concerns, please call our office. Follow-up with Dr. Lavon Jenkins in 4 weeks. Call [phone] for an appointment. Information obtained by : 
I understand that if any problems occur once I am at home I am to contact my physician. I understand and acknowledge receipt of the instructions indicated above. Physician's or R.N.'s Signature                                                                  Date/Time Patient or Representative Signature                                                          Date/Time Introducing [address]! Ema Mendez introduces [employer] patient portal. Now you can access parts of your medical record, email your doctor's office, and request medication refills online. 1. In your internet browser, go to [email or web address] 2. Click on the First Time User? Click Here link in the Sign In box. You will see the New Member Sign Up page. 3. Enter your [employer] Access Code exactly as it appears below. You will not need to use this code after youve completed the sign-up process. If you do not sign up before the expiration date, you must request a new code. · [employer] Access Code: [email or web address] Expires: 6/10/2018  1:08 PM 
 
4.  Enter the last four digits of your Social Security Number (xxxx) and Date of Birth (mm/dd/yyyy) as indicated and click Submit. You will be taken to the next sign-up page. 5. Create a [employer] ID. This will be your [employer] login ID and cannot be changed, so think of one that is secure and easy to remember. 6. Create a [employer] password. You can change your password at any time. 7. Enter your Password Reset Question and Answer. This can be used at a later time if you forget your password. 8. Enter your e-mail address. You will receive e-mail notification when new information is available in [address] 9. Click Sign Up. You can now view and download portions of your medical record. 10. Click the Download Summary menu link to download a portable copy of your medical information. If you have questions, please visit the Frequently Asked Questions section of the [employer] website. Remember, [employer] is NOT to be used for urgent needs. For medical emergencies, dial 911. Now available from your iPhone and Android! Providers Seen During Your Hospitalization Provider Specialty Primary office phone Jaquan Coreas MD Gynecologic Oncology [phone] Your Primary Care Physician (PCP) Primary Care Physician Office Phone Office Fax OTHER, PHYS ** None ** ** None ** You are allergic to the following No active allergies Recent Documentation Height Weight BMI OB Status Smoking Status 1.499 m 68.7 kg 30.57 kg/m2 Postmenopausal Never Smoker Emergency Contacts Name Discharge Info Relation Home Work Mobile Jennifer Ríos CAREGIVER [3] Spouse [3] [phone] Patient Belongings The following personal items are in your possession at time of discharge: 
  Dental Appliances: None  Visual Aid: Glasses      Home Medications: None   Jewelry: None  Clothing:  (bag of clothes returned to patient)    Other Valuables:  (eyeglasses returned to pt) Discharge Instructions Attachments/References MEFS - OXYCODONE, RAPID RELEASE (ETH-OXYDOSE, OXY IR, ROXICODONE) - (BY MOUTH) (ENGLISH) Patient Handouts Oxycodone, Rapid Release (ETH-Oxydose, Oxy IR, Roxicodone) - (By mouth) Why this medicine is used:  
Treats moderate to severe pain. This medicine is a narcotic pain reliever. Contact a nurse or doctor right away if you have: 
· Fast or slow heart beat, shallow breathing, blue lips, skin or fingernails · Anxiety, restlessness, fever, sweating, muscle spasms, twitching, seeing or hearing things that are not there · Extreme weakness, shallow breathing, slow heartbeat · Severe confusion, lightheadedness, dizziness, fainting · Sweating or cold, clammy skin, seizures · Severe constipation, stomach pain, nausea, vomiting Common side effects: · Mild constipation · Sleepiness, tiredness © 2017 [address] Information is for End User's use only and may not be sold, redistributed or otherwise used for commercial purposes. Please provide this summary of care documentation to your next provider. Signatures-by signing, you are acknowledging that this After Visit Summary has been reviewed with you and you have received a copy. Patient Signature:  ____________________________________________________________ Date:  ____________________________________________________________  
  
[facility] Provider Signature:  ____________________________________________________________ Date:  ____________________________________________________________

## 2018-03-21 NOTE — BRIEF OP NOTE
BRIEF OPERATIVE NOTE    Date of Procedure: 3/21/2018   Preoperative Diagnosis: ENDOMETRIAL CANCER  Postoperative Diagnosis: ENDOMETRIAL CANCER    Procedure(s): ROBOTIC ASSISTED TOTAL LAPAROSCOPIC HYSTERECTOMY, BILATERAL SALPINGOOOPHORECTOMY, SENTINEL PELVIC LYMPH NODE DISSECTION  Surgeon(s) and Role:     * Sree Mckeon MD - Primary  Assistant Staff: None  Surgical Staff:  Circ-1: Claudia Schaffer RN  Circ-Relief: Fransisco Santoyo RN  Scrub Tech-1: Dorothey Saint Paul  Surg Asst-1: Lincoln Cirri  Float Staff: Fransisco Santoyo RN  Event Time In   Incision Start 1114   Incision Close      Anesthesia: General   Estimated Blood Loss: 25 cc  Specimens:   ID Type Source Tests Collected by Time Destination   1 : right pelvic sentinel lymph node Fresh Abdomen  Sree Mckeon MD 3/21/2018 1136 Pathology   2 : left pelvic sentinel lymph node Fresh Abdomen  Sree Mckeon MD 3/21/2018 1142 Pathology   3 : uterus, cervix, bilateral fallopian tubes, ovaries Fresh Uterus with Bilateral Fallopian tubes and Lily Niall., MD 3/21/2018 1203 Pathology   1 : pelvic washings Fresh Pelvis  Sree Mckeon MD 3/21/2018 1145 Cytology      Findings: Normal appearing uterus, tubes, and ovaries. No peritoneal disease. Small omentum without disease. Unable to perform omentectomy due to visualization. Surprise nodes identified bilaterally.   Complications: None    Sree Mckeon MD

## 2018-03-21 NOTE — ANESTHESIA POSTPROCEDURE EVALUATION
Post-Anesthesia Evaluation and Assessment    Patient: Lizette Henry MRN: [id number]  SSN: [id number]    YOB: 1960  Age: 56 y.o. Sex: female       Cardiovascular Function/Vital Signs  Visit Vitals    /66    Pulse 77    Temp 36.4 °C (97.6 °F)    Resp 15    Ht 4' 11\" (1.499 m)    Wt 68.7 kg (151 lb 6 oz)    SpO2 100%    BMI 30.57 kg/m2       Patient is status post general anesthesia for Procedure(s):  ROBOTIC ASSISTED TOTAL LAPAROSCOPIC HYSTERECTOMY, BILATERAL SALPINGO OOPHORECTOMY, PELVIC LYMPH NODE DISSECTION. Nausea/Vomiting: None    Postoperative hydration reviewed and adequate. Pain:  Pain Scale 1: Numeric (0 - 10) (03/21/18 1635)  Pain Intensity 1: 6 (03/21/18 1635)   Managed    Neurological Status:   Neuro (WDL): Within Defined Limits (03/21/18 1330)  Neuro  Neurologic State: Appropriate for age;Eyes open to voice;Sleeping (03/21/18 1330)  LUE Motor Response: Purposeful (03/21/18 1330)  LLE Motor Response: Purposeful (03/21/18 1330)  RUE Motor Response: Purposeful (03/21/18 1330)  RLE Motor Response: Purposeful (03/21/18 1330)   At baseline    Mental Status and Level of Consciousness: Arousable    Pulmonary Status:   O2 Device: Nasal cannula (03/21/18 1330)   Adequate oxygenation and airway patent    Complications related to anesthesia: None    Post-anesthesia assessment completed.  No concerns    Signed By: Callie Marina MD     March 21, 2018

## 2018-03-21 NOTE — OP NOTES
Gynecologic Oncology Operative Report    Le Asai  3/21/2018    Pre-operative dx:  Endometrial cancer     Post-operative dx:  Endometrial cancer     Procedure:  1) Robotic-assisted total laparoscopic hysterectomy     2) Bilateral salpingooophorectomy     3) Laparoscopic sentinel pelvic lymph node dissection     Surgeon:  Claude Fisher, MD     Assistant:  Saundra Adame SA     Anesthesia:  GETA     EBL:  <76 cc     Complications:  None     Specimens:  uterus, cervix, bilateral fallopian tubes and ovaries, pelvic washings, bilateral sentinel pelvic lymph nodes     Operative indications:  57 yo BF with grade 3 endometrial cancer on biopsy. CT showed no evidence of metastatic disease. Operative findings:  Normal appearing uterus, tubes, and ovaries. No peritoneal disease. Small omentum without disease. Unable to perform omentectomy due to visualization. Monhegan nodes identified bilaterally. Normal appearing, but long appendix. Procedure in detail: After the risks, benefits, indications, and alternatives of the procedure were discussed with the patient and informed consent was obtained, the patient was taken to the operating room. She was positively identified, administered general anesthesia, and then placed in the dorsal lithotomy position in [address] [place] [address]. An exam under anesthesia was performed. She was then prepped and draped in the usual fashion. A mishra catheter was inserted. An open-sided speculum was used to visualize the cervix. The cervix was grasped with a single-tooth tenaculum and then progressively dilated using cervical dilators. Indocyanine green was then injected on either side of the cervix in preparation for sentinel lymph node mapping. I then placed a SocMetrics-Care uterine manipulator. We then proceeded with laparoscopy. A horizontal incision was made in the midline above the level of the umbilicus.   A Veres needle was inserted into the peritoneal cavity and the placement was confirmed. The abdomen was then insufflated to a pressure of 15 mm Hg. An 8 mm da Stephy trocar was then inserted at this site. The camera was then inserted to confirm proper placement. Three additional 8 mm da Stephy trocars were then placed under direct visualization, two on the right, and one on the left. These were placed approximately 8 cm apart in an arcing pattern. An 8 mm Airseal assistant port was then placed in the left abdomen, 8 cm from the lateral da Stephy trocar. Positioning of the trocars in the abdominal wall were then adjusted to locate the point of articulation at the level of the fascia. The patient was then placed in steep Trendelenberg position. The camera port was then docked and the camera was inserted into the #2 port and advanced. The targeting mechanism was then used to allow for better localization of the other three arms. These trocars were then docked with the [employer]. A fenestrated bipolar grasper was placed in arm #1, monopolar scissors placed in arm #3, and a Prograsp placed in arm #4. I then removed my gown and made my way to the console. The abdomen and pelvis were then examined, with the above mentioned findings noted. Pelvic washings were obtained as well. We then proceeded with the hysterectomy. The left round ligament was identified, then coagulated and transected with the cautery, allowing entry into the retroperitoneal space. The vesico-uterine peritoneum was divided with cauteryl from the left side across the midline, allowing visualization of the ring of the V-Care manipulator anteriorly. We then used the near-infrared filter on the da Stephy camera to visualize the retroperitoneum and identify the sentinel lymph node(s). The dissection was then carried out using a combination of sharp and blunt dissection and cautery as needed for hemostasis. We then directed our attention to the right side of the pelvis.  The right round ligament was identified and then coagulated and transected with cautery, allowing entry into the retroperitoneal space. The remaining vesico-uterine peritoneum was then divided with on the right side. We then used the near-infrared filter on the da Stephy camera to visualize the retroperitoneum and identify the sentinel lymph node(s). The dissection was then carried out using a combination of sharp and blunt dissection and cautery as needed for hemostasis. We then moved back to the left side of the pelvis. We identified the left ureter and bluntly developed the perirectal space. The left uterine artery was identified at its origin off of the hypogastric artery, and it was coagulated with bipolar cautery at that point, with the ureter being held on traction medially to ensure its safety. The left ovarian vessels were then isolated and then coagulated and transected with cautery. The posterior leaf of the broad ligament was then divided with the cautery up to the level of the uterine body. We then moved back to the right side of the pelvis. The right ureter was then identified and the perirectal space was bluntly developed. The right uterine artery was then identified at its origin off of the hypogastric artery. It was coagulated with bipolar cautery at that point, with the ureter being held on traction medially to ensure its safety. The right ovarian vessels were then isolated and then coagulated and transected with cautery. The posterior leaf of the broad ligament was then divided with the Harmonic Scalpel up to the level of the uterine body. We then moved anteriorly and the bladder was sharply dissected off of the lower uterine segment and cervix until it was well below the ring of the [employer]. The uterine arteries were then skeletonized bilaterally and then coagulated with bipolar cautery and transected with monopolar cautery at the level of the V-Care ring.  A circumferential colpotomy was then performed by using the monopolar scissors to cut down onto the V-Care ring. Once the colpotomy was completed, the specimen was delivered transvaginally and sent to pathology. A bulb syringe was then placed into the vagina to maintain pneumoperitoneum for vaginal cuff closure. The scissors were then removed from arm #3 and replaced with a BitAnimatecut needle . The cuff was then reapproximated with a running closure using a 2-0 Stratafix suture. Excellent reapproximation and hemostasis was noted. The pelvis was then irrigated and all pedicles inspected. Once satisfied with hemostasis, the gas was then allowed to escape from the abdomen and the trochars were removed. She was then taken out of Trendelenburg tilt. The incision sites were closed with a stitch of 4-0 Monocryl, followed by a layer of Dermabond. The bulb syringe was then removed from the vagina. The patient was taken out of stirrups, awakened from anesthesia, and taken to the recovery room in stable condition. All instrument, sponge, and needle counts were correct.      Linda Gaston MD  3/21/2018  12:15 PM

## 2018-03-21 NOTE — PERIOP NOTES
TRANSFER - OUT REPORT:    Verbal report given to Teresa (name) on Maximo Ahumada  being transferred to room 556 (unit) for routine post - op       Report consisted of patients Situation, Background, Assessment and   Recommendations(SBAR). Time Pre op antibiotic given:1053   Anesthesia Stop time: 2555  Ceja Present on Transfer to floor:yes  Order for Ceja on Chart:yes  Discharge Prescriptions with Chart:no    Information from the following report(s) SBAR, Kardex, OR Summary, Intake/Output, MAR, Med Rec Status and Cardiac Rhythm SR was reviewed with the receiving nurse. Opportunity for questions and clarification was provided. Is the patient on 02? YES       L/Min 2L       Is the patient on a monitor? NO    Is the nurse transporting with the patient? NO    Surgical Waiting Area notified of patient's transfer from PACU? YES      The following personal items collected during your admission accompanied patient upon transfer:   Dental Appliance: Dental Appliances: None  Vision: Visual Aid: With patient, Glasses  Hearing Aid:    Jewelry: Jewelry: None  Clothing: Clothing:  (bag of clothes returned to patient)  Other Valuables:  Other Valuables:  (eyeglasses returned to pt)  Valuables sent to safe:

## 2018-03-21 NOTE — IP AVS SNAPSHOT
Summary of Care Report The Summary of Care report has been created to help improve care coordination. Users with access to [employer] or [address] (Web-based application) may access additional patient information including the Discharge Summary. If you are not currently a [address] user and need more information, please call the number listed below in the [place] [address] section and ask to be connected with Medical Records. Facility Information Name Address Phone [address] [facility] [address] [phone] Patient Information Patient Name Sex YUAN Baig ([id number]) Female 1960 Discharge Information Admitting Provider Service Area Unit Glenda Kilpatrick MD / [phone] Shawn  / [phone] Discharge Provider Discharge Date/Time Discharge Disposition Destination (none) 3/22/2018 (Pending) AHR (none) Patient Language Language ENGLISH [13] Hospital Problems as of 3/22/2018  Reviewed: 3/21/2018  9:27 AM by Pat Sanchez CRNA Class Noted - Resolved Last Modified POA Active Problems Endometrial cancer ([facility] [address]  3/12/2018 - Present 3/21/2018 by Glenda Kilpatrick MD Unknown Entered by Glenda Kilpatrick MD  
  
Non-Hospital Problems as of 3/22/2018  Reviewed: 3/21/2018  9:27 AM by Pat Sanchez CRNA None You are allergic to the following No active allergies Current Discharge Medication List  
  
START taking these medications Dose & Instructions Dispensing Information Comments  
 oxyCODONE-acetaminophen 5-325 mg per tablet Commonly known as:  PERCOCET Dose:  1 Tab Take 1 Tab by mouth every four (4) hours as needed. Max Daily Amount: 6 Tabs. Quantity:  20 Tab Refills:  0 CONTINUE these medications which have NOT CHANGED Dose & Instructions Dispensing Information Comments CALCIUM 600 + D 600-125 mg-unit Tab Generic drug:  calcium-cholecalciferol (d3) Take  by mouth two (2) times a day. Refills:  0  
   
 magnesium oxide 400 mg tablet Commonly known as:  MAG-OX Dose:  400 mg Take 400 mg by mouth daily (after lunch). Refills:  0  
   
 pravastatin 10 mg tablet Commonly known as:  PRAVACHOL Take  by mouth nightly. Refills:  0  
   
 sertraline 100 mg tablet Commonly known as:  ZOLOFT Take  by mouth daily (after lunch). Refills:  0 Surgery Information ID Date/Time Status Primary Surgeon All Procedures Location [id number] 3/21/2018 [address] MD ROBOTIC ASSISTED TOTAL LAPAROSCOPIC HYSTERECTOMY, BILATERAL SALPINGO OOPHORECTOMY, PELVIC LYMPH NODE DISSECTION [facility] MAIN OR Follow-up Information Follow up With Details Comments Contact Info Phys Other, MD   Patient can only remember the practice name and not the physician Discharge Instructions [address] [place] [address], [address] [place] 
P [phone]  F [phone] Patient Discharge Instructions Micaela Renteria / [id number] : 1960 Admitted 3/21/2018 Discharged: 3/22/2018 Take Home Medications No current facility-administered medications on file prior to encounter. Current Outpatient Prescriptions on File Prior to Encounter Medication Sig Dispense Refill  pravastatin (PRAVACHOL) 10 mg tablet Take  by mouth nightly.  sertraline (ZOLOFT) 100 mg tablet Take  by mouth daily (after lunch). · It is important that you take the medication exactly as they are prescribed. · Keep your medication in the bottles provided by the pharmacist and keep a list of the medication names, dosages, and times to be taken in your wallet. · Do not take other medications without consulting your doctor. What to do at [facility] Recommended diet: Regular Diet, stay hydrated. You should take a stool softener such as colace for constipation. If constipation persists for >24 hours you should take a dose of Milk of Magnesia. Call if your constipation persists. If you have had a hysterectomy or vaginal surgery you may experience vaginal spotting. This is acceptable. Should the bleeding require more that 4 pads a day please call our office. Nothing per vagina for 6-8 weeks. Recommended activity: No driving for 1 week and/or while on pain medication Walk at least 6 times per day Showers okay, no tub bathing/swimming for two weeks Activity as able, stairs okay. If you had a laparoscopic procedure, no lifting greater than 25 lbs for 3 weeks. If you had an open procedure, no heavy lifting greater than 15 lbs for 6 weeks. If you experience any of the following persisting symptoms: 
 
Nausea/vomiting, fever > 101 F, diarrhea/constipation, increasing pain despite medication, increasing vaginal discharge or any concerns, please call our office. Follow-up with Dr. Ct Swartz in 4 weeks. Call [phone] for an appointment. Information obtained by : 
I understand that if any problems occur once I am at home I am to contact my physician. I understand and acknowledge receipt of the instructions indicated above. Physician's or R.N.'s Signature                                                                  Date/Time Patient or Representative Signature                                                          Date/Time Chart Review Routing History No Routing History on File

## 2018-03-22 VITALS
DIASTOLIC BLOOD PRESSURE: 62 MMHG | SYSTOLIC BLOOD PRESSURE: 92 MMHG | OXYGEN SATURATION: 99 % | TEMPERATURE: 97.3 F | HEART RATE: 64 BPM | BODY MASS INDEX: 30.52 KG/M2 | WEIGHT: 151.38 LBS | HEIGHT: 59 IN | RESPIRATION RATE: 16 BRPM

## 2018-03-22 LAB
ANION GAP SERPL CALC-SCNC: 12 MMOL/L (ref 5–15)
BUN SERPL-MCNC: 10 MG/DL (ref 6–20)
BUN/CREAT SERPL: 11 (ref 12–20)
CALCIUM SERPL-MCNC: 8.3 MG/DL (ref 8.5–10.1)
CHLORIDE SERPL-SCNC: 110 MMOL/L (ref 97–108)
CO2 SERPL-SCNC: 18 MMOL/L (ref 21–32)
CREAT SERPL-MCNC: 0.94 MG/DL (ref 0.55–1.02)
ERYTHROCYTE [DISTWIDTH] IN BLOOD BY AUTOMATED COUNT: 14.7 % (ref 11.5–14.5)
GLUCOSE SERPL-MCNC: 106 MG/DL (ref 65–100)
HCT VFR BLD AUTO: 37.7 % (ref 35–47)
HGB BLD-MCNC: 12.3 G/DL (ref 11.5–16)
MCH RBC QN AUTO: 28.5 PG (ref 26–34)
MCHC RBC AUTO-ENTMCNC: 32.6 G/DL (ref 30–36.5)
MCV RBC AUTO: 87.5 FL (ref 80–99)
NRBC # BLD: 0 K/UL (ref 0–0.01)
NRBC BLD-RTO: 0 PER 100 WBC
PLATELET # BLD AUTO: 202 K/UL (ref 150–400)
PMV BLD AUTO: 10.1 FL (ref 8.9–12.9)
POTASSIUM SERPL-SCNC: 4.7 MMOL/L (ref 3.5–5.1)
RBC # BLD AUTO: 4.31 M/UL (ref 3.8–5.2)
SODIUM SERPL-SCNC: 140 MMOL/L (ref 136–145)
WBC # BLD AUTO: 14.4 K/UL (ref 3.6–11)

## 2018-03-22 PROCEDURE — 74011250636 HC RX REV CODE- 250/636: Performed by: OBSTETRICS & GYNECOLOGY

## 2018-03-22 PROCEDURE — 80048 BASIC METABOLIC PNL TOTAL CA: CPT | Performed by: OBSTETRICS & GYNECOLOGY

## 2018-03-22 PROCEDURE — 85027 COMPLETE CBC AUTOMATED: CPT | Performed by: OBSTETRICS & GYNECOLOGY

## 2018-03-22 PROCEDURE — 36415 COLL VENOUS BLD VENIPUNCTURE: CPT | Performed by: OBSTETRICS & GYNECOLOGY

## 2018-03-22 PROCEDURE — 99218 HC RM OBSERVATION: CPT

## 2018-03-22 PROCEDURE — 74011250637 HC RX REV CODE- 250/637: Performed by: OBSTETRICS & GYNECOLOGY

## 2018-03-22 RX ORDER — OXYCODONE AND ACETAMINOPHEN 5; 325 MG/1; MG/1
1 TABLET ORAL
Qty: 20 TAB | Refills: 0 | Status: SHIPPED | OUTPATIENT
Start: 2018-03-22 | End: 2018-04-19 | Stop reason: ALTCHOICE

## 2018-03-22 RX ADMIN — OXYCODONE AND ACETAMINOPHEN 1 TABLET: 5; 325 TABLET ORAL at 11:54

## 2018-03-22 RX ADMIN — CEFAZOLIN SODIUM 2 G: 2 SOLUTION INTRAVENOUS at 11:54

## 2018-03-22 RX ADMIN — CEFAZOLIN SODIUM 2 G: 2 SOLUTION INTRAVENOUS at 04:55

## 2018-03-22 RX ADMIN — OXYCODONE AND ACETAMINOPHEN 1 TABLET: 5; 325 TABLET ORAL at 07:07

## 2018-03-22 RX ADMIN — SERTRALINE HYDROCHLORIDE 100 MG: 50 TABLET ORAL at 11:55

## 2018-03-22 NOTE — PROGRESS NOTES
[address] [phone]  F [phone]       Patient Name: Phil Baumann   Admit Date: 3/21/2018   OR Date: 3/21/2018       Visit Date: 3/22/2018        SUBJECTIVE:    No complaints this morning. Pain controlled. No nausea. OBJECTIVE:    Patient Vitals for the past 24 hrs:   Temp Pulse Resp BP SpO2   03/22/18 0702 - 60 16 93/60 100 %   03/22/18 0458 - - - (!) 88/56 -   03/22/18 0311 97.8 °F (36.6 °C) 60 16 (!) 88/51 97 %   03/21/18 2324 97.5 °F (36.4 °C) 61 16 100/69 100 %   03/21/18 2156 97.8 °F (36.6 °C) 61 16 107/61 100 %   03/21/18 2101 98.5 °F (36.9 °C) 61 14 104/68 100 %   03/21/18 1929 97.4 °F (36.3 °C) 74 14 105/71 100 %   03/21/18 1900 - 60 11 96/58 100 %   03/21/18 1830 - 72 17 103/56 100 %   03/21/18 1800 - 77 15 114/66 100 %   03/21/18 1730 - 70 12 123/78 100 %   03/21/18 1700 - 68 10 107/66 100 %   03/21/18 1630 - 84 15 127/77 99 %   03/21/18 1615 - 65 14 131/75 100 %   03/21/18 1600 - 64 15 126/73 100 %   03/21/18 1545 - 62 14 129/75 100 %   03/21/18 1530 - 72 14 132/79 100 %   03/21/18 1515 - 66 15 130/76 100 %   03/21/18 1500 - 62 14 132/71 100 %   03/21/18 1445 - (!) 56 13 130/70 100 %   03/21/18 1430 - 60 14 125/75 100 %   03/21/18 1415 - (!) 57 12 123/72 100 %   03/21/18 1400 - (!) 58 12 121/69 100 %   03/21/18 1345 - (!) 58 11 112/69 100 %   03/21/18 1330 97.6 °F (36.4 °C) (!) 56 13 134/71 100 %   03/21/18 1315 - (!) 54 12 125/70 100 %   03/21/18 1300 - 66 13 137/76 100 %   03/21/18 1255 - 61 13 (!) 130/92 100 %   03/21/18 1250 97.6 °F (36.4 °C) 66 16 123/72 98 %   03/21/18 1246 97.6 °F (36.4 °C) 68 13 154/80 98 %   03/21/18 1245 - 81 15 150/76 100 %   03/21/18 0833 98.6 °F (37 °C) 86 16 150/90 99 %         Date 03/21/18 0700 - 03/22/18 0659 03/22/18 0700 - 03/23/18 0659   Shift 5894-6558 6957-1595 24 Hour Total 3612-7342 2968-2798 24 Hour Total   I  N  T  A  K  E   P.O. 100  100         P. O. 100  100 I.V.  (mL/kg/hr) 1825  (2.2) 425  (0.5) 2250  (1.4)         I.V.  425 425         Volume (lactated Ringers infusion) 1200  1200         Volume (0.9% sodium chloride infusion) 625  625       Shift Total  (mL/kg) 1925  (28) 425  (6.2) 2350  (34.2)      O  U  T  P  U  T   Urine  (mL/kg/hr) 675  (0.8) 350  (0.4) 1025  (0.6) 450  450      Urine Output 175  175         Urine Output (mL) (Urinary Catheter 03/21/18 2- way; Ceja) 500 350 850 450  450    Emesis/NG output  1 1         Emesis  1 1       Blood 15  15         Estimated Blood Loss 15  15       Shift Total  (mL/kg) 690  (10) 351  (5.1) 1041  (15.2) 450  (6.6)  450  (6.6)   NET 1235 74 1309 -450  -450   Weight (kg) 68.7 68.7 68.7 68.7 68.7 68.7       Physical Exam     General:  alert, cooperative, no distress     Cardiac:  Regular rate and rhythm        Lungs:  clear to auscultation bilaterally  Abdomen:  soft, non-distended, appropriately tender       Wound:  clean, dry, no drainage   Extremity: extremities normal, atraumatic, no cyanosis or edema      Lab Results   Component Value Date/Time    WBC 14.4 (H) 03/22/2018 03:21 AM    ABS. NEUTROPHILS 5.9 03/16/2018 11:14 AM    HGB 12.3 03/22/2018 03:21 AM    HCT 37.7 03/22/2018 03:21 AM    MCV 87.5 03/22/2018 03:21 AM    MCH 28.5 03/22/2018 03:21 AM    PLATELET 342 48/21/9063 03:21 AM     Lab Results   Component Value Date/Time    Sodium 140 03/22/2018 03:21 AM    Potassium 4.7 03/22/2018 03:21 AM    Chloride 110 (H) 03/22/2018 03:21 AM    CO2 18 (L) 03/22/2018 03:21 AM    Glucose 106 (H) 03/22/2018 03:21 AM    BUN 10 03/22/2018 03:21 AM    Creatinine 0.94 03/22/2018 03:21 AM    Calcium 8.3 (L) 03/22/2018 03:21 AM    Albumin 4.1 03/16/2018 11:14 AM    Bilirubin, total 0.3 03/16/2018 11:14 AM    AST (SGOT) 18 03/16/2018 11:14 AM    ALT (SGPT) 24 03/16/2018 11:14 AM    Alk.  phosphatase 119 (H) 03/16/2018 11:14 AM     IMPRESSION/PLAN:    1 Day Post-Op Procedure(s):  ROBOTIC ASSISTED TOTAL LAPAROSCOPIC HYSTERECTOMY, BILATERAL SALPINGO OOPHORECTOMY, PELVIC LYMPH NODE DISSECTION for ENDOMETRIAL CANCER    Oncologic:  Grade 3 endometrial cancer. Final pathology pending. Heme/CV:  Hemodynamically stable. Normal Hgb. Renal:  Good UOP. Normal creatinine. FEN/GI:  Diet as tolerated. ID/Wound:  Afebrile. Mildly elevated WBC, likely reactive. PPX:  Ambulation. SCDs. Incentive spirometry. Dispostion:  Doing well postoperatively. Will d/c home today.         Yun Molina MD

## 2018-03-22 NOTE — PROGRESS NOTES
Bedside shift change report given to Calista Rose RN (oncoming nurse) by Valentina Felder RN (offgoing nurse). Report included the following information SBAR, Kardex, MAR and Recent Results.

## 2018-03-22 NOTE — PROGRESS NOTES
1:21 PM  I have reviewed discharge instructions with the patient and spouse. The patient and spouse verbalized understanding. Patient discharged home with family, all personal belongings, and rx.

## 2018-03-22 NOTE — DISCHARGE INSTRUCTIONS
[facility] [address]  P [phone]  F [phone]      Patient Discharge Instructions    Stephenie Echavarria / [id number] : 1960    Admitted 3/21/2018 Discharged: 3/22/2018     Take Home Medications     No current facility-administered medications on file prior to encounter. Current Outpatient Prescriptions on File Prior to Encounter   Medication Sig Dispense Refill    pravastatin (PRAVACHOL) 10 mg tablet Take  by mouth nightly.  sertraline (ZOLOFT) 100 mg tablet Take  by mouth daily (after lunch). · It is important that you take the medication exactly as they are prescribed. · Keep your medication in the bottles provided by the pharmacist and keep a list of the medication names, dosages, and times to be taken in your wallet. · Do not take other medications without consulting your doctor. What to do at Home    Recommended diet: Regular Diet, stay hydrated. You should take a stool softener such as colace for constipation. If constipation persists for >24 hours you should take a dose of Milk of Magnesia. Call if your constipation persists. If you have had a hysterectomy or vaginal surgery you may experience vaginal spotting. This is acceptable. Should the bleeding require more that 4 pads a day please call our office. Nothing per vagina for 6-8 weeks. Recommended activity:   No driving for 1 week and/or while on pain medication  Walk at least 6 times per day  Showers okay, no tub bathing/swimming for two weeks  Activity as able, stairs okay. If you had a laparoscopic procedure, no lifting greater than 25 lbs for 3 weeks. If you had an open procedure, no heavy lifting greater than 15 lbs for 6 weeks.     If you experience any of the following persisting symptoms:    Nausea/vomiting, fever > 101 F, diarrhea/constipation, increasing pain despite medication, increasing vaginal discharge or any concerns, please call our office. Follow-up with Dr. Jessica Milligan in 4 weeks. Call [phone] for an appointment. Information obtained by :  I understand that if any problems occur once I am at home I am to contact my physician. I understand and acknowledge receipt of the instructions indicated above.                                                                                                                                            Physician's or R.N.'s Signature                                                                  Date/Time                                                                                                                                              Patient or Representative Signature                                                          Date/Time

## 2018-04-11 ENCOUNTER — TELEPHONE (OUTPATIENT)
Dept: GYNECOLOGY | Age: 58
End: 2018-04-11

## 2018-04-11 NOTE — TELEPHONE ENCOUNTER
The pt wanted to know if she needs a form or letter for work. I advised her to contact them to see what they require and we are happy to provide that for her. She states her disability does not start until after a month. She has not been advised to provide anything at this time. I advised to contact me if anything is needed from us.

## 2018-04-19 ENCOUNTER — TELEPHONE (OUTPATIENT)
Dept: GYNECOLOGY | Age: 58
End: 2018-04-19

## 2018-04-19 ENCOUNTER — OFFICE VISIT (OUTPATIENT)
Dept: GYNECOLOGY | Age: 58
End: 2018-04-19

## 2018-04-19 VITALS
SYSTOLIC BLOOD PRESSURE: 147 MMHG | BODY MASS INDEX: 30.4 KG/M2 | DIASTOLIC BLOOD PRESSURE: 94 MMHG | WEIGHT: 150.8 LBS | HEART RATE: 105 BPM | HEIGHT: 59 IN

## 2018-04-19 DIAGNOSIS — C54.1 ENDOMETRIAL CANCER (HCC): Primary | ICD-10-CM

## 2018-04-19 NOTE — PROGRESS NOTES
[address] [facility] [address], [address]  P [phone]  F [phone]    Postoperative Office Note  Patient ID:  Name: Jossy Laguna  MRM: [id number]  : 1960/57 y.o. Date: 2018    Diagnosis:     ICD-10-CM ICD-9-CM    1. Endometrial cancer ([facility] [address] C54.1 182.0 REFERRAL TO RADIATION ONCOLOGY       Problem List:   Patient Active Problem List   Diagnosis Code    Endometrial cancer ([facility] [address] C54.1       SUBJECTIVE:    Jossy Laguna is a 56 y.o. female who presents for followup postoperative care following robotic TLH, BSO, SPLND. The patient's pathology revealed: FINAL PATHOLOGIC DIAGNOSIS   1. Lymph nodes, right pelvic, [place], dissection: Three lymph nodes negative for metastatic carcinoma (0/3)   Three H&E levels and immunohistochemistry for pankeratin examined   2. Lymph node, left pelvic, sentinel, excisional biopsy:   One lymph node negative for metastatic carcinoma (0/1)   Three H&E levels and immunohistochemistry for pankeratin examined   3.  Uterus and cervix (58 g), fallopian tubes and ovaries, hysterectomy, bilateral salpingo-oophorectomy:   Cervix: Squamous metaplasia   Myometrium: Adenomyosis   Endometrium: Residual serous adenocarcinoma, high-grade   Ovaries and fallopian tubes: No diagnostic pathologic abnormality   Serosa: No diagnostic pathologic abnormality   See comment and synoptic report   Synoptic report:   Procedure: Hysterectomy, bilateral salpingo-oophorectomy   Lymph nodes: Right and left pelvic sentinel lymph nodes   Histologic type: Serous adenocarcinoma with focal endometrioid adenocarcinoma   Histologic grade: High-grade   Myometrial invasion: Present   Depth of invasion: 1 mm   Myometrial thickness: 15 mm   Uterine serosa: Uninvolved with adenocarcinoma   Cervical stromal involvement: Not identified   Other organ involvement: Not present   Lymphovascular invasion: Not identified   Lymph nodes: Four lymph nodes negative for metastatic carcinoma (0/4)   Pathologic stage (TMN):   Primary tumor: pT1a   Regional lymph nodes: pN0   Distant metastases: pMX   Comment   There is a 5 mm focus of serous adenocarcinoma and a background of complex, atypical hyperplasia and focal endometrioid adenocarcinoma. Currently she has no problems with eating, bowel movements, or voiding. Appetite is good. Eating a regular diet without difficulty. Bowel movements are regular. The patient is not having any pain. Medications:     Current Outpatient Prescriptions on File Prior to Visit   Medication Sig Dispense Refill    magnesium oxide (MAG-OX) 400 mg tablet Take 400 mg by mouth daily (after lunch).  calcium-cholecalciferol, d3, (CALCIUM 600 + D) 600-125 mg-unit tab Take  by mouth two (2) times a day.  pravastatin (PRAVACHOL) 10 mg tablet Take  by mouth nightly.  sertraline (ZOLOFT) 100 mg tablet Take  by mouth daily (after lunch). No current facility-administered medications on file prior to visit. Allergies:   No Known Allergies  Past Medical History:   Diagnosis Date    Arthritis     Cancer ([facility] [address] 03/2018    ENDOMETRIAL    High cholesterol     Kidney stones     Psychiatric disorder     DEPRESSION     Past Surgical History:   Procedure Laterality Date    HX BREAST LUMPECTOMY      bilateral lump removal-BENIGN    HX OTHER SURGICAL      RIGHT ARM LUMPECTOMY    HX OTHER SURGICAL      KIDNEY STONES REMOVED    HX TONSILLECTOMY       Social History     Social History    Marital status:      Spouse name: N/A    Number of children: N/A    Years of education: N/A     Occupational History    Not on file.      Social History Main Topics    Smoking status: Never Smoker    Smokeless tobacco: Never Used    Alcohol use Yes      Comment: RARELY    Drug use: No    Sexual activity: Not on file     Other Topics Concern    Not on file     Social History Narrative       OBJECTIVE:    Vitals:   Vitals:    04/19/18 8013 04/19/18 0948   BP: (!) 160/97 (!) 147/94   Pulse: (!) 103 (!) 105   Weight: 150 lb 12.8 oz (68.4 kg)    Height: 4' 11.02\" (1.499 m)      Physical Examination:     General:  alert, cooperative, no distress   Lungs: lungs clear to auscultation   Cardiac: Regular rate and rhythm   Abdomen: soft, bowel sounds active, non-tender  No CVA tenderness   Incision: healing well   Pelvic: External genitalia: normal general appearance  Urinary system: urethral meatus normal  Vaginal: normal without tenderness, induration or masses  Cervix: absent  Adnexa: removed surgically  Uterus: absent   Rectal: not done   Extremity:   extremities normal, atraumatic, no cyanosis or edema     IMPRESSION:    Kinjal Gongora is doing well postoperatively. She has a working diagnosis of stage IA, grade 3 endometrial carcinoma, mixed endometrioid and serous. Medical problems:     Patient Active Problem List   Diagnosis Code    Endometrial cancer ([facility] [address] C54.1       PLAN:    The operative procedures and clinical results have been reviewed with the patient. Implications of diagnosis discussed at length. All questions answered. I explained the more aggressive nature of papillary serous carcinomas of the uterus and the increased risk of recurrence. I also discussed adjuvant therapy, specifically chemotherapy and radiation therapy. Based upon recent publications, I thing she would be best served by radiation. I am going to refer her to radiation oncology for consultation. The patient may return to work. I will see the patient back after she has completed radiation therapy or in 3 months. The patient is advised to call our office with any problems or concerns.     Katarina Schulte MD  4/19/2018/9:52 AM

## 2018-04-19 NOTE — PROGRESS NOTES
Post operative follow up. Patient states no abnormal spotting or bleeding. First bp 160/97 and recheck was 147/94.

## 2018-04-23 ENCOUNTER — TELEPHONE (OUTPATIENT)
Dept: GYNECOLOGY | Age: 58
End: 2018-04-23

## 2018-04-23 NOTE — TELEPHONE ENCOUNTER
The pt states she is four weeks post hysterectomy, she had her follow up exam last week and this morning she had very light spotting x1. She wants to know if this is normal. I advised the pt this is not uncommon for 6 weeks or so following her surgery. Advised  to call with heavy bleeding or pain. The pt is in agreement with this plan.

## 2018-05-07 ENCOUNTER — HOSPITAL ENCOUNTER (OUTPATIENT)
Dept: RADIATION THERAPY | Age: 58
Discharge: HOME | End: 2018-05-07

## 2018-09-04 ENCOUNTER — OFFICE VISIT (OUTPATIENT)
Dept: GYNECOLOGY | Age: 58
End: 2018-09-04

## 2018-09-04 VITALS
SYSTOLIC BLOOD PRESSURE: 133 MMHG | HEART RATE: 74 BPM | WEIGHT: 149.8 LBS | HEIGHT: 59 IN | DIASTOLIC BLOOD PRESSURE: 85 MMHG | BODY MASS INDEX: 30.2 KG/M2

## 2018-09-04 DIAGNOSIS — C54.1 ENDOMETRIAL CANCER (HCC): Primary | ICD-10-CM

## 2018-09-04 NOTE — PROGRESS NOTES
[address] [place] [address] [address], [address] [facility] [phone]  F [phone]    Office Note  Patient ID:  Name:  Obey Alfonso  MRN:  [id number]  :  1960/57 y.o. Date:  2018      HISTORY OF PRESENT ILLNESS:  Alex Coronado is a 56 y.o.  postmenopausal female who was referred to me for a diagnosis of endometrial cancer by Dr. Xiao Garcia in [facility]. On 3/21/18, I took her to the OR for a robotic assisted TLH, BSO, PLND. 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 Postoperatively she was treated with pelvic radiation therapy, which she received in [facility]. She presents today for follow-up. She is doing well and is without complaints. She denies any vaginal bleeding or discharge, any pelvic or abdominal pain, or any changes in her bowel or bladder habits. ROS:   and GI review:  Negative  Cardiopulmonary review:  Negative   Musculoskeletal:  Negative    A comprehensive review of systems was negative except for that written in the History of Present Illness. , 10 point ROS        Problem List:  Patient Active Problem List    Diagnosis Date Noted    [facility]     PMH:  Past Medical History:   Diagnosis Date    Arthritis     [facility]    ENDOMETRIAL    High cholesterol     Kidney stones     Psychiatric disorder     DEPRESSION      PSH:  Past Surgical History:   Procedure Laterality Date    HX BREAST LUMPECTOMY      bilateral lump removal-BENIGN    HX OTHER SURGICAL      RIGHT ARM LUMPECTOMY    HX OTHER SURGICAL      KIDNEY STONES REMOVED    HX TONSILLECTOMY        Social History:  Social History   Substance Use Topics    Smoking status: Never Smoker    Smokeless tobacco: Never Used    Alcohol use Yes      Comment: RARELY      Family History:  Family History   Problem Relation Age of Onset    Diabetes Mother  Heart Disease Mother     Elevated Lipids Mother     Hypertension Mother     Prostate Cancer Father     Cancer Father      PROSTATE    Hypertension Father     Diabetes Sister     Hypertension Brother     Heart Disease Brother     Diabetes Sister      PRE    No Known Problems Son     No Known Problems Son     No Known Problems Daughter     Anesth Problems Neg Hx       Medications: (reviewed)  Current Outpatient Prescriptions   Medication Sig    magnesium oxide (MAG-OX) 400 mg tablet Take 400 mg by mouth daily (after lunch).  calcium-cholecalciferol, d3, (CALCIUM 600 + D) 600-125 mg-unit tab Take  by mouth two (2) times a day.  pravastatin (PRAVACHOL) 10 mg tablet Take  by mouth nightly.  sertraline (ZOLOFT) 100 mg tablet Take  by mouth daily (after lunch). No current facility-administered medications for this visit. Allergies: (reviewed)  No Known Allergies       OBJECTIVE:    Physical Exam:  VITAL SIGNS: There were no vitals filed for this visit. There is no height or weight on file to calculate BMI. GENERAL RUSS: Conversant, alert, oriented. No acute distress. HEENT: HEENT. No thyroid enlargement. No JVD. Neck: Supple without restrictions. RESPIRATORY: Clear to auscultation and percussion to the bases. No CVAT. CARDIOVASC: RRR without murmur/rub. GASTROINT: soft, non-tender, without masses or organomegaly   MUSCULOSKEL: no joint tenderness, deformity or swelling   EXTREMITIES: extremities normal, atraumatic, no cyanosis or edema   PELVIC: External genitalia: normal general appearance  Urinary system: urethral meatus normal  Vaginal: normal without tenderness, induration or masses  Cervix: absent  Adnexa: removed surgically  Uterus: absent   RECTAL: Deferred   GURMEET SURVEY: No suspicious lymphadenopathy or edema noted. NEURO: Grossly intact. No acute deficit.          Lab Results   Component Value Date/Time    WBC 14.4 (H) 03/22/2018 03:21 AM    HGB 12.3 03/22/2018 03:21 AM    HCT 37.7 03/22/2018 03:21 AM    PLATELET 957 53/85/1851 03:21 AM    MCV 87.5 03/22/2018 03:21 AM     Lab Results   Component Value Date/Time    Sodium 140 03/22/2018 03:21 AM    Potassium 4.7 03/22/2018 03:21 AM    Chloride 110 (H) 03/22/2018 03:21 AM    CO2 18 (L) 03/22/2018 03:21 AM    Anion gap 12 03/22/2018 03:21 AM    Glucose 106 (H) 03/22/2018 03:21 AM    BUN 10 03/22/2018 03:21 AM    Creatinine 0.94 03/22/2018 03:21 AM    BUN/Creatinine ratio 11 (L) 03/22/2018 03:21 AM    GFR est AA >60 03/22/2018 03:21 AM    GFR est non-AA >60 03/22/2018 03:21 AM    Calcium 8.3 (L) 03/22/2018 03:21 AM         IMPRESSION/PLAN:  Nila Walters is a 56 y.o. female with a diagnosis of stage IA, grade 3, endometrial cancer, with mixed serous and endometrioid histology. She underwent surgical resection followed by pelvic radiation therapy. She has no evidence of disease on today. We will plan on seeing her back in 3 months for continued surveillance.         Signed By: Chalo Funes MD     9/4/2018/10:02 AM

## 2018-12-05 NOTE — PROGRESS NOTES
Three month check up. Patient reports no abnormal bleeding or pain. 1. Have you been to the ER, urgent care clinic since your last visit? Hospitalized since your last visit? Hospitalized last week to evaluate for stroke. 2. Have you seen or consulted any other health care providers outside of the [address] [place] [address] since your last visit? Include any pap smears or colon screening.  No

## 2018-12-06 ENCOUNTER — OFFICE VISIT (OUTPATIENT)
Dept: GYNECOLOGY | Age: 58
End: 2018-12-06

## 2018-12-06 VITALS
SYSTOLIC BLOOD PRESSURE: 145 MMHG | BODY MASS INDEX: 30.64 KG/M2 | DIASTOLIC BLOOD PRESSURE: 87 MMHG | HEIGHT: 59 IN | WEIGHT: 152 LBS | HEART RATE: 78 BPM

## 2018-12-06 DIAGNOSIS — C54.1 ENDOMETRIAL CANCER (HCC): Primary | ICD-10-CM

## 2018-12-06 RX ORDER — ATORVASTATIN CALCIUM 40 MG/1
40 TABLET, FILM COATED ORAL
COMMUNITY
Start: 2018-11-14 | End: 2018-12-14

## 2018-12-06 RX ORDER — DICLOFENAC SODIUM 50 MG/1
TABLET, DELAYED RELEASE ORAL 2 TIMES DAILY
COMMUNITY

## 2018-12-06 NOTE — PROGRESS NOTES
[address] [place] [address] [address], [address] [facility]  P [phone]  F [phone]    Office Note  Patient ID:  Name:  Kahlil Talbert  MRN:  [id number]  :  1960/58 y.o. Date:  2018      HISTORY OF PRESENT ILLNESS:  Hernandez Kirkpatrick is a 56 y.o.  postmenopausal female who was referred to me for a diagnosis of endometrial cancer by Dr. Jarad Navarro in [place]. On 3/21/18, I took her to the OR for a robotic assisted TLH, BSO, PLND. 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 Postoperatively she was treated with pelvic radiation therapy, which she received in [place]. She presents today for follow-up. She is doing well and is without complaints. She denies any vaginal bleeding or discharge, any pelvic or abdominal pain, or any changes in her bowel or bladder habits. ROS:   and GI review:  Negative  Cardiopulmonary review:  Negative   Musculoskeletal:  Negative    A comprehensive review of systems was negative except for that written in the History of Present Illness. , 10 point ROS        Problem List:  Patient Active Problem List    Diagnosis Date Noted    Endometrial cancer ([facility] [address] 2018     PMH:  Past Medical History:   Diagnosis Date    Arthritis     [facility]    ENDOMETRIAL    High cholesterol     Kidney stones     Psychiatric disorder     DEPRESSION      PSH:  Past Surgical History:   Procedure Laterality Date    HX BREAST LUMPECTOMY      bilateral lump removal-BENIGN    HX OTHER SURGICAL      RIGHT ARM LUMPECTOMY    HX OTHER SURGICAL      KIDNEY STONES REMOVED    HX TONSILLECTOMY        Social History:  Social History     Tobacco Use    Smoking status: Never Smoker    Smokeless tobacco: Never Used   Substance Use Topics    Alcohol use: Yes     Comment: RARELY      Family History:  Family History   Problem Relation Age of Onset  Diabetes Mother     Heart Disease Mother     Elevated Lipids Mother     Hypertension Mother     Prostate Cancer Father     Cancer Father         PROSTATE    Hypertension Father     Diabetes Sister     Hypertension Brother     Heart Disease Brother     Diabetes Sister         PRE    No Known Problems Son     No Known Problems Son     No Known Problems Daughter     Anesth Problems Neg Hx       Medications: (reviewed)  Current Outpatient Medications   Medication Sig    atorvastatin (LIPITOR) 40 mg tablet Take 40 mg by mouth.  diclofenac EC (VOLTAREN) 50 mg EC tablet Take  by mouth two (2) times a day.  magnesium oxide (MAG-OX) 400 mg tablet Take 400 mg by mouth daily (after lunch).  calcium-cholecalciferol, d3, (CALCIUM 600 + D) 600-125 mg-unit tab Take  by mouth two (2) times a day.  sertraline (ZOLOFT) 100 mg tablet Take  by mouth daily (after lunch).  pravastatin (PRAVACHOL) 10 mg tablet Take  by mouth nightly. No current facility-administered medications for this visit. Allergies: (reviewed)  No Known Allergies       OBJECTIVE:    Physical Exam:  VITAL SIGNS: Vitals:    12/06/18 1049   BP: 145/87   Pulse: 78   Weight: 152 lb (68.9 kg)   Height: 4' 11.02\" (1.499 m)     Body mass index is 30.68 kg/m². GENERAL RUSS: Conversant, alert, oriented. No acute distress. HEENT: HEENT. No thyroid enlargement. No JVD. Neck: Supple without restrictions. RESPIRATORY: Clear to auscultation and percussion to the bases. No CVAT. CARDIOVASC: RRR without murmur/rub.    GASTROINT: soft, non-tender, without masses or organomegaly   MUSCULOSKEL: no joint tenderness, deformity or swelling   EXTREMITIES: extremities normal, atraumatic, no cyanosis or edema   PELVIC: External genitalia: normal general appearance  Urinary system: urethral meatus normal  Vaginal: normal without tenderness, induration or masses  Cervix: absent  Adnexa: removed surgically  Uterus: absent   RECTAL: Deferred GURMEET SURVEY: No suspicious lymphadenopathy or edema noted. NEURO: Grossly intact. No acute deficit. Lab Results   Component Value Date/Time    WBC 14.4 (H) 03/22/2018 03:21 AM    HGB 12.3 03/22/2018 03:21 AM    HCT 37.7 03/22/2018 03:21 AM    PLATELET 556 33/48/6616 03:21 AM    MCV 87.5 03/22/2018 03:21 AM     Lab Results   Component Value Date/Time    Sodium 140 03/22/2018 03:21 AM    Potassium 4.7 03/22/2018 03:21 AM    Chloride 110 (H) 03/22/2018 03:21 AM    CO2 18 (L) 03/22/2018 03:21 AM    Anion gap 12 03/22/2018 03:21 AM    Glucose 106 (H) 03/22/2018 03:21 AM    BUN 10 03/22/2018 03:21 AM    Creatinine 0.94 03/22/2018 03:21 AM    BUN/Creatinine ratio 11 (L) 03/22/2018 03:21 AM    GFR est AA >60 03/22/2018 03:21 AM    GFR est non-AA >60 03/22/2018 03:21 AM    Calcium 8.3 (L) 03/22/2018 03:21 AM         IMPRESSION/PLAN:  Brandi Love is a 66 y.o. female with a diagnosis of stage IA, grade 3, endometrial cancer, with mixed serous and endometrioid histology. She underwent surgical resection followed by pelvic radiation therapy. She has no evidence of disease on today. We will plan on seeing her back in 3 months for continued surveillance.         Signed By: Shivani Narayanan MD     12/6/2018/10:02 AM

## 2019-01-22 ENCOUNTER — PATIENT OUTREACH (OUTPATIENT)
Dept: ONCOLOGY | Age: 59
End: 2019-01-22

## 2019-01-22 NOTE — PROGRESS NOTES
This Survivorship Care Plan is a cancer treatment summary and follow up plan and is provided to you to keep with your alexia plan records and to share with your primary care provider or any of your doctors and nurses. This summary is a brief record of major aspects of your cancer treatment and not a detailed or comprehensive record of your care. You should review this with your cancer provider. Patient Name: Malcolm Garcia 
 Patient : 1960 Health Care Providers (Including Names, Institution) Primary Care Provider: Mervin, MD [place]  
Gynecologic Oncologist:  Kosta De Jesus MD    [address] [place] [address] [facility] Radiation Oncologist:  Karen Diaz MD [facility], [facility] Other Providers: Kitty Cardoza DO Treatment Summary Diagnosis Cancer Type/Location/Histology Subtype: endometrial carcinoma with mixed serous and endometrioid features Stage: IA Year diagnosed:  Treatment Surgery Yes Surgery Date(s) (year):  Surgical procedure/location/findings: 
ROBOTIC ASSISTED TOTAL LAPAROSCOPIC HYSTERECTOMY, BILATERAL SALPINGO OOPHORECTOMY, PELVIC LYMPH NODE DISSECTION Radiation Yes Body area treated: Pelvis and vagina Systemic Therapy (chemotherapy, hormonal therapy, other) No 
  
  
Persistent symptoms or side effects at completion of treatment: ? No  
   
Familial Cancer Risk Assessment Genetic counseling: Not applicable Follow-up Care Plan Need for ongoing (adjuvant) treatment for cancer   No 
  
Schedule of clinical visits Coordinating Provider When/How often Dr. Angelo Faith 3/7/2019 and directed by him Dr. Jack Bowman Follow up in 2019 as directed by her Cancer surveillance or other recommended related tests Coordinating Provider What/When/How Often Dr. Angelo Faith History and physical every 3-6 months for 2-3 years then every 6 months or annually as directed by provider. Imaging as indicated by provider Please continue to see your primary care provider for all general health care recommended for a (man) (woman) your age, including cancer screening tests. Any symptoms should be brought to the attention of your provider: 1. Anything that represents a brand new symptom; 2. Anything that represents a persistent symptom; 3. Anything you are worried about that might be related to the cancer coming back. Potential Common Side Effects of Treatment: 
 
May include post-surgical pain, fatigue, lymphedema, weight gain, fluid retention, menopause, changes in sexuality, pain with intercourse, vaginal dryness, depression/anxiety, and numbness/tingling. If you experience these, please talk to your health care 
providers about available therapies that may help your symptoms. Cancer survivors may experience issues with the areas listed below. If you have any concerns in these or other areas, please speak with your doctors or nurses to find out how you can get help with them. Emotional and mental health        Fatigue                                          Weight changes             Stopping smoking                         Physical Functioning                      Insurance               School/Work                                 Financial advice or assistance Memory or concentration loss      Parenting                                       Fertility                             Sexual functioning Other A number of lifestyle/behaviors can affect your ongoing health, including the risk for the cancer coming back or developing another cancer. Discuss these recommendations with your doctor or nurse: Tobacco use/cessation                    Diet                                         Alcohol use                           Sun screen use                               Weight management (loss/gain)                                           Physical activity Resources you may be interested in:  
Cancer. net Cancercare. org Other comments: 
 
  
Prepared by: Prabhakar Castaneda RN, OCN Delivered on: 1/22/19

## 2019-01-22 NOTE — LETTER
1/22/2019 1:58 PM 
 
Ms. Umesh Manuel 
[address] [address] Dear Umesh Manuel: 
 
 
 
The [address] [place] [address] Team is committed to your health and well-being. The Cancer Survivorship Program offers support to patients who have completed or are nearing completion of their cancer treatment. The program provides patients with their plan of care and resources to help support patients as they transition into the survivorship phase of their care. A survivorship care plan is a record of your cancer and treatment history, as well as any checkups or follow-up tests you need in the future. It will also include a list of possible long-term effects of your treatments and ideas for staying healthy. Your plan will give you recommendations of when you need to get follow-up tests, and which doctors are responsible for your care. Keep your plan and refer to it so you can be sure you get the tests you need. Your plan includes important information about your cancer and treatment, which helps you and your doctors understand each other. Bring it with you whenever you go to the doctor. Please review your enclosed survivorship care plan and contact me if I can answer any questions or provide additional resources. Thad Gonzalez RN, OCN Cancer Survivorship  [address] [phone] [email or web address]

## 2019-03-06 NOTE — PROGRESS NOTES
Three month check up for history of endometrial cancer. Patient reports no abnormal bleeding or pain. 1. Have you been to the ER, urgent care clinic since your last visit? Hospitalized since your last visit? Yes, seen in ER in February for dizziness. Patient is being evaluated and scheduled for colonoscopy. 2. Have you seen or consulted any other health care providers outside of the [address] [place] [address] since your last visit? Include any pap smears or colon screening.  No

## 2019-03-07 ENCOUNTER — OFFICE VISIT (OUTPATIENT)
Dept: GYNECOLOGY | Age: 59
End: 2019-03-07

## 2019-03-07 VITALS
BODY MASS INDEX: 29.43 KG/M2 | SYSTOLIC BLOOD PRESSURE: 110 MMHG | DIASTOLIC BLOOD PRESSURE: 75 MMHG | HEART RATE: 89 BPM | HEIGHT: 59 IN | WEIGHT: 146 LBS

## 2019-03-07 DIAGNOSIS — C54.1 ENDOMETRIAL CANCER (HCC): Primary | ICD-10-CM

## 2019-03-07 NOTE — PROGRESS NOTES
[address], [address], [address]  P [phone]  F [phone]    Office Note  Patient ID:  Name:  Ema Brunson  MRN:  [id number]  :  1960/58 y.o. Date:  3/7/2019      HISTORY OF PRESENT ILLNESS:  Khadar Gilmore is a 56 y.o.  postmenopausal female who was referred to me for a diagnosis of endometrial cancer by Dr. Ilda Michael in [facility]. On 3/21/18, I took her to the OR for a robotic assisted TLH, BSO, PLND. 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 Postoperatively she was treated with pelvic radiation therapy, which she received in [facility]. She presents today for follow-up. She is doing well and is without complaints. She denies any vaginal bleeding or discharge, any pelvic or abdominal pain, or any changes in her bowel or bladder habits. ROS:   and GI review:  Negative  Cardiopulmonary review:  Negative   Musculoskeletal:  Negative    A comprehensive review of systems was negative except for that written in the History of Present Illness. , 10 point ROS        Problem List:  Patient Active Problem List    Diagnosis Date Noted    Endometrial cancer ([facility] [address] [facility] 2018     PMH:  Past Medical History:   Diagnosis Date    Arthritis     [facility]    ENDOMETRIAL    High cholesterol     Kidney stones     Psychiatric disorder     DEPRESSION      PSH:  Past Surgical History:   Procedure Laterality Date    HX BREAST LUMPECTOMY      bilateral lump removal-BENIGN    HX OTHER SURGICAL      RIGHT ARM LUMPECTOMY    HX OTHER SURGICAL      KIDNEY STONES REMOVED    HX TONSILLECTOMY        Social History:  Social History     Tobacco Use    Smoking status: Never Smoker    Smokeless tobacco: Never Used   Substance Use Topics    Alcohol use: Yes     Comment: RARELY      Family History:  Family History   Problem Relation Age of Onset    Diabetes Mother     Heart Disease Mother     Elevated Lipids Mother     Hypertension Mother     Prostate Cancer Father     Cancer Father         PROSTATE    Hypertension Father     Diabetes Sister     Hypertension Brother     Heart Disease Brother     Diabetes Sister         PRE    No Known Problems Son     No Known Problems Son     No Known Problems Daughter     Anesth Problems Neg Hx       Medications: (reviewed)  Current Outpatient Medications   Medication Sig    diclofenac EC (VOLTAREN) 50 mg EC tablet Take  by mouth two (2) times a day.  magnesium oxide (MAG-OX) 400 mg tablet Take 400 mg by mouth daily (after lunch).  calcium-cholecalciferol, d3, (CALCIUM 600 + D) 600-125 mg-unit tab Take  by mouth two (2) times a day.  pravastatin (PRAVACHOL) 10 mg tablet Take  by mouth nightly.  sertraline (ZOLOFT) 100 mg tablet Take  by mouth daily (after lunch). No current facility-administered medications for this visit. Allergies: (reviewed)  No Known Allergies       OBJECTIVE:    Physical Exam:  VITAL SIGNS: Vitals:    03/07/19 1011   BP: 110/75   Pulse: 89   Weight: 146 lb (66.2 kg)   Height: 4' 11.02\" (1.499 m)     Body mass index is 29.47 kg/m². GENERAL RUSS: Conversant, alert, oriented. No acute distress. HEENT: HEENT. No thyroid enlargement. No JVD. Neck: Supple without restrictions. RESPIRATORY: Clear to auscultation and percussion to the bases. No CVAT. CARDIOVASC: RRR without murmur/rub. GASTROINT: soft, non-tender, without masses or organomegaly   MUSCULOSKEL: no joint tenderness, deformity or swelling   EXTREMITIES: extremities normal, atraumatic, no cyanosis or edema   PELVIC: External genitalia: normal general appearance  Urinary system: urethral meatus normal  Vaginal: normal without tenderness, induration or masses  Cervix: absent  Adnexa: removed surgically  Uterus: absent   RECTAL: Deferred   GURMEET SURVEY: No suspicious lymphadenopathy or edema noted. NEURO: Grossly intact. No acute deficit. Lab Results   Component Value Date/Time    WBC 14.4 (H) 03/22/2018 03:21 AM    HGB 12.3 03/22/2018 03:21 AM    HCT 37.7 03/22/2018 03:21 AM    PLATELET 472 68/62/7327 03:21 AM    MCV 87.5 03/22/2018 03:21 AM     Lab Results   Component Value Date/Time    Sodium 140 03/22/2018 03:21 AM    Potassium 4.7 03/22/2018 03:21 AM    Chloride 110 (H) 03/22/2018 03:21 AM    CO2 18 (L) 03/22/2018 03:21 AM    Anion gap 12 03/22/2018 03:21 AM    Glucose 106 (H) 03/22/2018 03:21 AM    BUN 10 03/22/2018 03:21 AM    Creatinine 0.94 03/22/2018 03:21 AM    BUN/Creatinine ratio 11 (L) 03/22/2018 03:21 AM    GFR est AA >60 03/22/2018 03:21 AM    GFR est non-AA >60 03/22/2018 03:21 AM    Calcium 8.3 (L) 03/22/2018 03:21 AM         IMPRESSION/PLAN:  Jossy Laguna is a 66 y.o. female with a diagnosis of stage IA, grade 3, endometrial cancer, with mixed serous and endometrioid histology. She underwent surgical resection followed by pelvic radiation therapy. She has no evidence of disease on today. We will plan on seeing her back in 3 months for continued surveillance.         Signed By: Anthony Brambila MD     3/7/2019/10:02 AM

## 2019-06-06 ENCOUNTER — OFFICE VISIT (OUTPATIENT)
Dept: GYNECOLOGY | Age: 59
End: 2019-06-06

## 2019-06-06 VITALS
BODY MASS INDEX: 30 KG/M2 | DIASTOLIC BLOOD PRESSURE: 79 MMHG | HEART RATE: 72 BPM | WEIGHT: 148.8 LBS | HEIGHT: 59 IN | SYSTOLIC BLOOD PRESSURE: 118 MMHG

## 2019-06-06 DIAGNOSIS — C54.1 ENDOMETRIAL CANCER (HCC): Primary | ICD-10-CM

## 2019-06-06 NOTE — PROGRESS NOTES
[address] [place] [address] [address], [address] [facility]  P [phone]  F [phone]    Office Note  Patient ID:  Name:  Timmy Lorenz  MRN:  [id number]  :  1960/58 y.o. Date:  2019      HISTORY OF PRESENT ILLNESS:  Charissa Champagne is a 56 y.o.  postmenopausal female who was referred to me for a diagnosis of endometrial cancer by Dr. Nohemy Nice in [facility]. On 3/21/18, I took her to the OR for a robotic assisted TLH, BSO, PLND. 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 Postoperatively she was treated with pelvic radiation therapy, which she received in [facility]. She presents today for follow-up surveillance. She is doing well and is without complaints. She denies any vaginal bleeding or discharge, any pelvic or abdominal pain, or any changes in her bowel or bladder habits. ROS:   and GI review:  Negative  Cardiopulmonary review:  Negative   Musculoskeletal:  Negative    A comprehensive review of systems was negative except for that written in the History of Present Illness. , 10 point ROS        Problem List:  Patient Active Problem List    Diagnosis Date Noted    Endometrial cancer ([facility] 2018     PMH:  Past Medical History:   Diagnosis Date    Arthritis     [facility]    ENDOMETRIAL    Diverticulosis     High cholesterol     Kidney stones     Psychiatric disorder     DEPRESSION      PSH:  Past Surgical History:   Procedure Laterality Date    HX BREAST LUMPECTOMY      bilateral lump removal-BENIGN    HX COLONOSCOPY  2019    Diverticulosis    HX OTHER SURGICAL      RIGHT ARM LUMPECTOMY    HX OTHER SURGICAL      KIDNEY STONES REMOVED    HX TONSILLECTOMY        Social History:  Social History     Tobacco Use    Smoking status: Never Smoker    Smokeless tobacco: Never Used   Substance Use Topics    Alcohol use:  Yes Comment: RARELY      Family History:  Family History   Problem Relation Age of Onset    Diabetes Mother     Heart Disease Mother     Elevated Lipids Mother     Hypertension Mother     Prostate Cancer Father     Cancer Father         PROSTATE    Hypertension Father     Diabetes Sister     Hypertension Brother     Heart Disease Brother     Diabetes Sister         PRE    No Known Problems Son     No Known Problems Son     No Known Problems Daughter     Anesth Problems Neg Hx       Medications: (reviewed)  Current Outpatient Medications   Medication Sig    lipase-protease-amylase (ZENPEP) 40,000-126,000- 168,000 unit cpDR capsule Take  by mouth.  magnesium oxide (MAG-OX) 400 mg tablet Take 400 mg by mouth daily (after lunch).  calcium-cholecalciferol, d3, (CALCIUM 600 + D) 600-125 mg-unit tab Take  by mouth two (2) times a day.  pravastatin (PRAVACHOL) 10 mg tablet Take  by mouth nightly.  sertraline (ZOLOFT) 100 mg tablet Take  by mouth daily (after lunch).  diclofenac EC (VOLTAREN) 50 mg EC tablet Take  by mouth two (2) times a day. No current facility-administered medications for this visit. Allergies: (reviewed)  No Known Allergies       OBJECTIVE:    Physical Exam:  VITAL SIGNS: Vitals:    06/06/19 1010   BP: 118/79   Pulse: 72   Weight: 148 lb 12.8 oz (67.5 kg)   Height: 4' 11.02\" (1.499 m)     Body mass index is 30.04 kg/m². GENERAL RUSS: Conversant, alert, oriented. No acute distress. HEENT: HEENT. No thyroid enlargement. No JVD. Neck: Supple without restrictions. RESPIRATORY: Clear to auscultation and percussion to the bases. No CVAT. CARDIOVASC: RRR without murmur/rub.    GASTROINT: soft, non-tender, without masses or organomegaly   MUSCULOSKEL: no joint tenderness, deformity or swelling   EXTREMITIES: extremities normal, atraumatic, no cyanosis or edema   PELVIC: External genitalia: normal general appearance  Urinary system: urethral meatus normal  Vaginal: normal without tenderness, induration or masses  Cervix: absent  Adnexa: removed surgically  Uterus: absent   RECTAL: Deferred   GURMEET SURVEY: No suspicious lymphadenopathy or edema noted. NEURO: Grossly intact. No acute deficit. Lab Results   Component Value Date/Time    WBC 14.4 (H) 03/22/2018 03:21 AM    HGB 12.3 03/22/2018 03:21 AM    HCT 37.7 03/22/2018 03:21 AM    PLATELET 320 31/43/6214 03:21 AM    MCV 87.5 03/22/2018 03:21 AM     Lab Results   Component Value Date/Time    Sodium 140 03/22/2018 03:21 AM    Potassium 4.7 03/22/2018 03:21 AM    Chloride 110 (H) 03/22/2018 03:21 AM    CO2 18 (L) 03/22/2018 03:21 AM    Anion gap 12 03/22/2018 03:21 AM    Glucose 106 (H) 03/22/2018 03:21 AM    BUN 10 03/22/2018 03:21 AM    Creatinine 0.94 03/22/2018 03:21 AM    BUN/Creatinine ratio 11 (L) 03/22/2018 03:21 AM    GFR est AA >60 03/22/2018 03:21 AM    GFR est non-AA >60 03/22/2018 03:21 AM    Calcium 8.3 (L) 03/22/2018 03:21 AM         IMPRESSION/PLAN:  Cheryl Cedeño is a 66 y.o. female with a diagnosis of stage IA, grade 3, endometrial cancer, with mixed serous and endometrioid histology. She underwent surgical resection followed by pelvic radiation therapy. She has no evidence of disease on today. We will plan on seeing her back in 3 months for continued surveillance.         Signed By: Rena Lazo MD     6/6/2019/10:02 AM

## 2019-06-06 NOTE — PROGRESS NOTES
Three month follow up for history of endometrial cancer. Patient had a colonoscopy on 5/17/19 and was diagnosed with diverticulosis.

## 2019-09-12 ENCOUNTER — OFFICE VISIT (OUTPATIENT)
Dept: GYNECOLOGY | Age: 59
End: 2019-09-12

## 2019-09-12 VITALS
HEART RATE: 75 BPM | HEIGHT: 59 IN | DIASTOLIC BLOOD PRESSURE: 89 MMHG | SYSTOLIC BLOOD PRESSURE: 125 MMHG | WEIGHT: 147.6 LBS | BODY MASS INDEX: 29.76 KG/M2

## 2019-09-12 DIAGNOSIS — C54.1 ENDOMETRIAL CANCER (HCC): Primary | ICD-10-CM

## 2019-09-12 NOTE — PROGRESS NOTES
[address] [place] [address] [address], [address] [facility]  P [phone]  F [phone]    Office Note  Patient ID:  Name:  Makenna Blount  MRN:  [id number]  :  1960/58 y.o. Date:  2019      HISTORY OF PRESENT ILLNESS:  Rodney Malone is a 56 y.o.  postmenopausal female who was referred to me for a diagnosis of endometrial cancer by Dr. Maria C Mathew in [facility]. On 3/21/18, I took her to the OR for a robotic assisted TLH, BSO, PLND. 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 Postoperatively she was treated with pelvic radiation therapy, which she received in [facility]. She presents today for follow-up surveillance. She is doing well and is without complaints. She denies any vaginal bleeding or discharge, any pelvic or abdominal pain, or any changes in her bowel or bladder habits. ROS:   and GI review:  Negative  Cardiopulmonary review:  Negative   Musculoskeletal:  Negative    A comprehensive review of systems was negative except for that written in the History of Present Illness. , 10 point ROS        Problem List:  Patient Active Problem List    Diagnosis Date Noted    Endometrial cancer ([facility] [address] 2018     PMH:  Past Medical History:   Diagnosis Date    Arthritis     Cancer ([facility] 2018    ENDOMETRIAL    Diverticulosis     High cholesterol     Kidney stones     Psychiatric disorder     DEPRESSION      PSH:  Past Surgical History:   Procedure Laterality Date    HX BREAST LUMPECTOMY      bilateral lump removal-BENIGN    HX COLONOSCOPY  2019    Diverticulosis    HX OTHER SURGICAL      RIGHT ARM LUMPECTOMY    HX OTHER SURGICAL      KIDNEY STONES REMOVED    HX TONSILLECTOMY        Social History:  Social History     Tobacco Use    Smoking status: Never Smoker    Smokeless tobacco: Never Used   Substance Use Topics    Alcohol use:  Yes Comment: RARELY      Family History:  Family History   Problem Relation Age of Onset    Diabetes Mother     Heart Disease Mother     Elevated Lipids Mother     Hypertension Mother     Prostate Cancer Father     Cancer Father         PROSTATE    Hypertension Father     Diabetes Sister     Hypertension Brother     Heart Disease Brother     Diabetes Sister         PRE    No Known Problems Son     No Known Problems Son     No Known Problems Daughter     Anesth Problems Neg Hx       Medications: (reviewed)  Current Outpatient Medications   Medication Sig    lipase-protease-amylase (ZENPEP) 40,000-126,000- 168,000 unit cpDR capsule Take  by mouth.  diclofenac EC (VOLTAREN) 50 mg EC tablet Take  by mouth two (2) times a day.  magnesium oxide (MAG-OX) 400 mg tablet Take 400 mg by mouth daily (after lunch).  calcium-cholecalciferol, d3, (CALCIUM 600 + D) 600-125 mg-unit tab Take  by mouth two (2) times a day.  pravastatin (PRAVACHOL) 10 mg tablet Take  by mouth nightly.  sertraline (ZOLOFT) 100 mg tablet Take  by mouth daily (after lunch). No current facility-administered medications for this visit. Allergies: (reviewed)  No Known Allergies       OBJECTIVE:    Physical Exam:  VITAL SIGNS: Vitals:    09/12/19 1007 09/12/19 1024   BP: 145/88 125/89   Pulse: 79 75   Weight: 147 lb 9.6 oz (67 kg)    Height: 4' 11.02\" (1.499 m)      Body mass index is 29.8 kg/m². GENERAL RUSS: Conversant, alert, oriented. No acute distress. HEENT: HEENT. No thyroid enlargement. No JVD. Neck: Supple without restrictions. RESPIRATORY: Clear to auscultation and percussion to the bases. No CVAT. CARDIOVASC: RRR without murmur/rub.    GASTROINT: soft, non-tender, without masses or organomegaly   MUSCULOSKEL: no joint tenderness, deformity or swelling   EXTREMITIES: extremities normal, atraumatic, no cyanosis or edema   PELVIC: External genitalia: normal general appearance  Urinary system: urethral meatus normal  Vaginal: normal without tenderness, induration or masses  Cervix: absent  Adnexa: removed surgically  Uterus: absent   RECTAL: Deferred   GURMEET SURVEY: No suspicious lymphadenopathy or edema noted. NEURO: Grossly intact. No acute deficit. Lab Results   Component Value Date/Time    WBC 14.4 (H) 03/22/2018 03:21 AM    HGB 12.3 03/22/2018 03:21 AM    HCT 37.7 03/22/2018 03:21 AM    PLATELET 387 88/56/6724 03:21 AM    MCV 87.5 03/22/2018 03:21 AM     Lab Results   Component Value Date/Time    Sodium 140 03/22/2018 03:21 AM    Potassium 4.7 03/22/2018 03:21 AM    Chloride 110 (H) 03/22/2018 03:21 AM    CO2 18 (L) 03/22/2018 03:21 AM    Anion gap 12 03/22/2018 03:21 AM    Glucose 106 (H) 03/22/2018 03:21 AM    BUN 10 03/22/2018 03:21 AM    Creatinine 0.94 03/22/2018 03:21 AM    BUN/Creatinine ratio 11 (L) 03/22/2018 03:21 AM    GFR est AA >60 03/22/2018 03:21 AM    GFR est non-AA >60 03/22/2018 03:21 AM    Calcium 8.3 (L) 03/22/2018 03:21 AM         IMPRESSION/PLAN:  Zeinab Solorio is a 66 y.o. female with a diagnosis of stage IA, grade 3, endometrial cancer, with mixed serous and endometrioid histology. She underwent surgical resection followed by pelvic radiation therapy. She has no evidence of disease on today. We will plan on seeing her back in 3 months for continued surveillance.         Signed By: Andrey Ibanez MD     9/12/2019/10:02 AM

## 2019-09-12 NOTE — PROGRESS NOTES
Three month follow up for history of endometrial cancer. Patient reports no abnormal bleeding or pain. 1. Have you been to the ER, urgent care clinic since your last visit? Hospitalized since your last visit? No    2. Have you seen or consulted any other health care providers outside of the [address] [place] [address] since your last visit? Include any pap smears or colon screening.  No

## 2019-12-17 ENCOUNTER — OFFICE VISIT (OUTPATIENT)
Dept: GYNECOLOGY | Age: 59
End: 2019-12-17

## 2019-12-17 VITALS
BODY MASS INDEX: 29.84 KG/M2 | SYSTOLIC BLOOD PRESSURE: 133 MMHG | HEIGHT: 59 IN | WEIGHT: 148 LBS | DIASTOLIC BLOOD PRESSURE: 82 MMHG | HEART RATE: 73 BPM

## 2019-12-17 DIAGNOSIS — C54.1 ENDOMETRIAL CANCER (HCC): Primary | ICD-10-CM

## 2019-12-17 NOTE — PROGRESS NOTES
[address] [facility] [address], [address]  P [phone]  F [phone]    Office Note  Patient ID:  Name:  Quiana Sun  MRN:  [id number]  :  1960/59 y.o. Date:  2019      HISTORY OF PRESENT ILLNESS:  Vikram Harry is a 56 y.o.  postmenopausal female who was referred to me for a diagnosis of endometrial cancer by Dr. Yun Romo in [facility]. On 3/21/18, I took her to the OR for a robotic assisted TLH, BSO, PLND. 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 Postoperatively she was treated with pelvic radiation therapy, which she received in [facility]. She presents today for follow-up surveillance. She is doing well and is without complaints. She denies any vaginal bleeding or discharge, any pelvic or abdominal pain, or any changes in her bowel or bladder habits. ROS:   and GI review:  Negative  Cardiopulmonary review:  Negative   Musculoskeletal:  Negative    A comprehensive review of systems was negative except for that written in the History of Present Illness. , 10 point ROS        Problem List:  Patient Active Problem List    Diagnosis Date Noted    Endometrial cancer ([facility] 2018     PMH:  Past Medical History:   Diagnosis Date    Arthritis     Cancer ([facility] 2018    ENDOMETRIAL    Diverticulosis     High cholesterol     Kidney stones     Psychiatric disorder     DEPRESSION      PSH:  Past Surgical History:   Procedure Laterality Date    HX BREAST LUMPECTOMY      bilateral lump removal-BENIGN    HX COLONOSCOPY  2019    Diverticulosis    HX OTHER SURGICAL      RIGHT ARM LUMPECTOMY    HX OTHER SURGICAL      KIDNEY STONES REMOVED    HX TONSILLECTOMY        Social History:  Social History     Tobacco Use    Smoking status: Never Smoker    Smokeless tobacco: Never Used   Substance Use Topics    Alcohol use:  Yes Comment: RARELY      Family History:  Family History   Problem Relation Age of Onset    Diabetes Mother     Heart Disease Mother     Elevated Lipids Mother     Hypertension Mother     Prostate Cancer Father     Cancer Father         PROSTATE    Hypertension Father     Diabetes Sister     Hypertension Brother     Heart Disease Brother     Diabetes Sister         PRE    No Known Problems Son     No Known Problems Son     No Known Problems Daughter     Anesth Problems Neg Hx       Medications: (reviewed)  Current Outpatient Medications   Medication Sig    lipase-protease-amylase (ZENPEP) 40,000-126,000- 168,000 unit cpDR capsule Take  by mouth.  diclofenac EC (VOLTAREN) 50 mg EC tablet Take  by mouth two (2) times a day.  magnesium oxide (MAG-OX) 400 mg tablet Take 400 mg by mouth daily (after lunch).  calcium-cholecalciferol, d3, (CALCIUM 600 + D) 600-125 mg-unit tab Take  by mouth two (2) times a day.  pravastatin (PRAVACHOL) 10 mg tablet Take  by mouth nightly.  sertraline (ZOLOFT) 100 mg tablet Take  by mouth daily (after lunch). No current facility-administered medications for this visit. Allergies: (reviewed)  No Known Allergies       OBJECTIVE:    Physical Exam:  VITAL SIGNS: Vitals:    12/17/19 1008   BP: 133/82   Pulse: 73   Weight: 148 lb (67.1 kg)   Height: 4' 11.02\" (1.499 m)     Body mass index is 29.88 kg/m². GENERAL RUSS: Conversant, alert, oriented. No acute distress. HEENT: HEENT. No thyroid enlargement. No JVD. Neck: Supple without restrictions. RESPIRATORY: Clear to auscultation and percussion to the bases. No CVAT. CARDIOVASC: RRR without murmur/rub.    GASTROINT: soft, non-tender, without masses or organomegaly   MUSCULOSKEL: no joint tenderness, deformity or swelling   EXTREMITIES: extremities normal, atraumatic, no cyanosis or edema   PELVIC: External genitalia: normal general appearance  Urinary system: urethral meatus normal  Vaginal: normal without tenderness, induration or masses  Cervix: absent  Adnexa: removed surgically  Uterus: absent   RECTAL: Deferred   GURMEET SURVEY: No suspicious lymphadenopathy or edema noted. NEURO: Grossly intact. No acute deficit. Lab Results   Component Value Date/Time    WBC 14.4 (H) 03/22/2018 03:21 AM    HGB 12.3 03/22/2018 03:21 AM    HCT 37.7 03/22/2018 03:21 AM    PLATELET 080 44/85/1885 03:21 AM    MCV 87.5 03/22/2018 03:21 AM     Lab Results   Component Value Date/Time    Sodium 140 03/22/2018 03:21 AM    Potassium 4.7 03/22/2018 03:21 AM    Chloride 110 (H) 03/22/2018 03:21 AM    CO2 18 (L) 03/22/2018 03:21 AM    Anion gap 12 03/22/2018 03:21 AM    Glucose 106 (H) 03/22/2018 03:21 AM    BUN 10 03/22/2018 03:21 AM    Creatinine 0.94 03/22/2018 03:21 AM    BUN/Creatinine ratio 11 (L) 03/22/2018 03:21 AM    GFR est AA >60 03/22/2018 03:21 AM    GFR est non-AA >60 03/22/2018 03:21 AM    Calcium 8.3 (L) 03/22/2018 03:21 AM         IMPRESSION/PLAN:  Joanie Stewart is a 64 y.o. female with a diagnosis of stage IA, grade 3, endometrial cancer, with mixed serous and endometrioid histology. She underwent surgical resection followed by pelvic radiation therapy. She has no evidence of disease on today. We will plan on seeing her back in 3 months for continued surveillance.         Signed By: Escobar Barreto MD     12/17/2019/10:02 AM

## 2019-12-17 NOTE — PROGRESS NOTES
Check up for history of cervical cancer. Pt states no abnormal spotting, bleeding or pain. 1. Have you been to the ER, urgent care clinic since your last visit? Hospitalized since your last visit? 2. Have you seen or consulted any other health care providers outside of the [address] [place] [address] since your last visit? Include any pap smears or colon screening.

## 2020-08-04 ENCOUNTER — OFFICE VISIT (OUTPATIENT)
Dept: GYNECOLOGY | Age: 60
End: 2020-08-04
Payer: OTHER GOVERNMENT

## 2020-08-04 VITALS
BODY MASS INDEX: 31.8 KG/M2 | WEIGHT: 147.4 LBS | HEART RATE: 80 BPM | HEIGHT: 57 IN | SYSTOLIC BLOOD PRESSURE: 145 MMHG | DIASTOLIC BLOOD PRESSURE: 89 MMHG

## 2020-08-04 DIAGNOSIS — C54.1 ENDOMETRIAL CANCER (HCC): Primary | ICD-10-CM

## 2020-08-04 PROCEDURE — 99214 OFFICE O/P EST MOD 30 MIN: CPT | Performed by: OBSTETRICS & GYNECOLOGY

## 2020-08-04 NOTE — PROGRESS NOTES
Three month check up for history of endometrial cancer. Pt states no abnormal spotting, bleeding or pain. 1. Have you been to the ER, urgent care clinic since your last visit? Hospitalized since your last visit? Hernia repair in March 2020.      2. Have you seen or consulted any other health care providers outside of the [address] [place] [address] since your last visit? Include any pap smears or colon screening.  No

## 2020-08-04 NOTE — PROGRESS NOTES
[address] [place] [address] [address], [address]  P [phone]  F [phone]    Office Note  Patient ID:  Name:  Tigist Reyna  MRN:  [id number]  :  1960/59 y.o. Date:  2020      HISTORY OF PRESENT ILLNESS:  Raman Sahu is a 56 y.o.  postmenopausal female who was referred to me for a diagnosis of endometrial cancer by Dr. Radha Thayer in [facility]. On 3/21/18, I took her to the OR for a robotic assisted TLH, BSO, PLND. 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 Postoperatively she was treated with pelvic radiation therapy, which she received in [facility]. She presents today for follow-up surveillance. She is doing well and is without complaints. She denies any vaginal bleeding or discharge, any pelvic or abdominal pain, or any changes in her bowel or bladder habits. ROS:   and GI review:  Negative  Cardiopulmonary review:  Negative   Musculoskeletal:  Negative    A comprehensive review of systems was negative except for that written in the History of Present Illness. , 10 point ROS        Problem List:  Patient Active Problem List    Diagnosis Date Noted    Endometrial cancer ([facility] [address] 2018     PMH:  Past Medical History:   Diagnosis Date    Arthritis     Cancer ([facility] [address] 2018    ENDOMETRIAL    Diverticulosis     High cholesterol     Kidney stones     Psychiatric disorder     DEPRESSION      PSH:  Past Surgical History:   Procedure Laterality Date    HX BREAST LUMPECTOMY      bilateral lump removal-BENIGN    HX COLONOSCOPY  2019    Diverticulosis    HX OTHER SURGICAL      RIGHT ARM LUMPECTOMY    HX OTHER SURGICAL      KIDNEY STONES REMOVED    HX TONSILLECTOMY        Social History:  Social History     Tobacco Use    Smoking status: Never Smoker    Smokeless tobacco: Never Used   Substance Use Topics    Alcohol use:  Yes Comment: RARELY      Family History:  Family History   Problem Relation Age of Onset    Diabetes Mother     Heart Disease Mother     Elevated Lipids Mother     Hypertension Mother     Prostate Cancer Father     Cancer Father         PROSTATE    Hypertension Father     Diabetes Sister     Hypertension Brother     Heart Disease Brother     Diabetes Sister         PRE    No Known Problems Son     No Known Problems Son     No Known Problems Daughter     Anesth Problems Neg Hx       Medications: (reviewed)  Current Outpatient Medications   Medication Sig    lipase-protease-amylase (ZENPEP) 40,000-126,000- 168,000 unit cpDR capsule Take  by mouth.  magnesium oxide (MAG-OX) 400 mg tablet Take 400 mg by mouth daily (after lunch).  calcium-cholecalciferol, d3, (CALCIUM 600 + D) 600-125 mg-unit tab Take  by mouth two (2) times a day.  pravastatin (PRAVACHOL) 10 mg tablet Take  by mouth nightly.  sertraline (ZOLOFT) 100 mg tablet Take  by mouth daily (after lunch).  diclofenac EC (VOLTAREN) 50 mg EC tablet Take  by mouth two (2) times a day. No current facility-administered medications for this visit. Allergies: (reviewed)  No Known Allergies       OBJECTIVE:    Physical Exam:  VITAL SIGNS: Vitals:    08/04/20 0942   Weight: 147 lb 6.4 oz (66.9 kg)   Height: 4' 9.05\" (1.449 m)     Body mass index is 31.84 kg/m². GENERAL RUSS: Conversant, alert, oriented. No acute distress. HEENT: HEENT. No thyroid enlargement. No JVD. Neck: Supple without restrictions. RESPIRATORY: Clear to auscultation and percussion to the bases. No CVAT. CARDIOVASC: RRR without murmur/rub.    GASTROINT: soft, non-tender, without masses or organomegaly   MUSCULOSKEL: no joint tenderness, deformity or swelling   EXTREMITIES: extremities normal, atraumatic, no cyanosis or edema   PELVIC: External genitalia: normal general appearance  Urinary system: urethral meatus normal  Vaginal: normal without tenderness, induration or masses  Cervix: absent  Adnexa: removed surgically  Uterus: absent   RECTAL: Deferred   GURMEET SURVEY: No suspicious lymphadenopathy or edema noted. NEURO: Grossly intact. No acute deficit. Lab Results   Component Value Date/Time    WBC 14.4 (H) 03/22/2018 03:21 AM    HGB 12.3 03/22/2018 03:21 AM    HCT 37.7 03/22/2018 03:21 AM    PLATELET 015 81/29/0197 03:21 AM    MCV 87.5 03/22/2018 03:21 AM     Lab Results   Component Value Date/Time    Sodium 140 03/22/2018 03:21 AM    Potassium 4.7 03/22/2018 03:21 AM    Chloride 110 (H) 03/22/2018 03:21 AM    CO2 18 (L) 03/22/2018 03:21 AM    Anion gap 12 03/22/2018 03:21 AM    Glucose 106 (H) 03/22/2018 03:21 AM    BUN 10 03/22/2018 03:21 AM    Creatinine 0.94 03/22/2018 03:21 AM    BUN/Creatinine ratio 11 (L) 03/22/2018 03:21 AM    GFR est AA >60 03/22/2018 03:21 AM    GFR est non-AA >60 03/22/2018 03:21 AM    Calcium 8.3 (L) 03/22/2018 03:21 AM         IMPRESSION/PLAN:  Alirio Mccarthy is a 64 y.o. female with a diagnosis of stage IA, grade 3, endometrial cancer, with mixed serous and endometrioid histology. She underwent surgical resection followed by pelvic radiation therapy. She has no evidence of disease on today. We will plan on seeing her back in 6 months for continued surveillance.         Signed By: Bassem Suresh MD     8/4/2020/10:02 AM

## 2021-02-10 NOTE — PROGRESS NOTES
Six month check up for history of endometrial cancer. Pt states no abnormal spotting, bleeding or pain. 1. Have you been to the ER, urgent care clinic since your last visit? Hospitalized since your last visit?no  2. Have you seen or consulted any other health care providers outside of the [address] [place] [address] since your last visit? Include any pap smears or colon screening.  no

## 2021-02-10 NOTE — PROGRESS NOTES
[address] [place] [address] [address], [address]  P [phone]  F [phone]    Office Note  Patient ID:  Name:  Brenda Briscoe  MRN:  [id number]  :  1960/60 y.o. Date:  2021      HISTORY OF PRESENT ILLNESS:  Vanessa Kern is a 56 y.o.  postmenopausal female who is an established patient that was referred to me for a diagnosis of endometrial cancer by Dr. Sheryl Pantoja in [facility]. On 3/21/18, I took her to the OR for a robotic assisted TLH, BSO, PLND. 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 Postoperatively she was treated with pelvic radiation therapy, which she received in [facility]. She presents today for follow-up surveillance. She is doing well and is without complaints. She denies any vaginal bleeding or discharge, any pelvic or abdominal pain, or any changes in her bowel or bladder habits. ROS:   and GI review:  Negative  Cardiopulmonary review:  Negative   Musculoskeletal:  Negative    A comprehensive review of systems was negative except for that written in the History of Present Illness. , 10 point ROS        Problem List:  Patient Active Problem List    Diagnosis Date Noted    Endometrial cancer ([facility] [address] 2018     PMH:  Past Medical History:   Diagnosis Date    Arthritis     Cancer ([facility] [address] 2018    ENDOMETRIAL    Diverticulosis     High cholesterol     Kidney stones     Psychiatric disorder     DEPRESSION      PSH:  Past Surgical History:   Procedure Laterality Date    HX BREAST LUMPECTOMY      bilateral lump removal-BENIGN    HX COLONOSCOPY  2019    Diverticulosis    HX OTHER SURGICAL      RIGHT ARM LUMPECTOMY    HX OTHER SURGICAL      KIDNEY STONES REMOVED    HX TONSILLECTOMY        Social History:  Social History     Tobacco Use    Smoking status: Never Smoker    Smokeless tobacco: Never Used   Substance Use Topics    Alcohol use: Yes     Comment: RARELY      Family History:  Family History   Problem Relation Age of Onset    Diabetes Mother     Heart Disease Mother     Elevated Lipids Mother     Hypertension Mother     Prostate Cancer Father     Cancer Father         PROSTATE    Hypertension Father     Diabetes Sister     Hypertension Brother     Heart Disease Brother     Diabetes Sister         PRE    No Known Problems Son     No Known Problems Son     No Known Problems Daughter     Anesth Problems Neg Hx       Medications: (reviewed)  Current Outpatient Medications   Medication Sig    lipase-protease-amylase (ZENPEP) 40,000-126,000- 168,000 unit cpDR capsule Take  by mouth.  diclofenac EC (VOLTAREN) 50 mg EC tablet Take  by mouth two (2) times a day.  magnesium oxide (MAG-OX) 400 mg tablet Take 400 mg by mouth daily (after lunch).  calcium-cholecalciferol, d3, (CALCIUM 600 + D) 600-125 mg-unit tab Take  by mouth two (2) times a day.  pravastatin (PRAVACHOL) 10 mg tablet Take  by mouth nightly.  sertraline (ZOLOFT) 100 mg tablet Take  by mouth daily (after lunch). No current facility-administered medications for this visit. Allergies: (reviewed)  No Known Allergies       OBJECTIVE:    Physical Exam:  VITAL SIGNS: Vitals:    02/11/21 1419   BP: 134/89   Pulse: 76   Weight: 144 lb (65.3 kg)   Height: 4' 11.02\" (1.499 m)     Body mass index is 29.07 kg/m². GENERAL RUSS: Conversant, alert, oriented. No acute distress. HEENT: HEENT. No thyroid enlargement. No JVD. Neck: Supple without restrictions. RESPIRATORY: Clear to auscultation and percussion to the bases. No CVAT. CARDIOVASC: RRR without murmur/rub.    GASTROINT: soft, non-tender, without masses or organomegaly   MUSCULOSKEL: no joint tenderness, deformity or swelling   EXTREMITIES: extremities normal, atraumatic, no cyanosis or edema   PELVIC: External genitalia: normal general appearance  Urinary system: urethral meatus normal  Vaginal: normal without tenderness, induration or masses  Cervix: absent  Adnexa: removed surgically  Uterus: absent   RECTAL: Deferred   GURMEET SURVEY: No suspicious lymphadenopathy or edema noted. NEURO: Grossly intact. No acute deficit. Lab Results   Component Value Date/Time    WBC 14.4 (H) 03/22/2018 03:21 AM    HGB 12.3 03/22/2018 03:21 AM    HCT 37.7 03/22/2018 03:21 AM    PLATELET 175 51/86/3579 03:21 AM    MCV 87.5 03/22/2018 03:21 AM     Lab Results   Component Value Date/Time    Sodium 140 03/22/2018 03:21 AM    Potassium 4.7 03/22/2018 03:21 AM    Chloride 110 (H) 03/22/2018 03:21 AM    CO2 18 (L) 03/22/2018 03:21 AM    Anion gap 12 03/22/2018 03:21 AM    Glucose 106 (H) 03/22/2018 03:21 AM    BUN 10 03/22/2018 03:21 AM    Creatinine 0.94 03/22/2018 03:21 AM    BUN/Creatinine ratio 11 (L) 03/22/2018 03:21 AM    GFR est AA >60 03/22/2018 03:21 AM    GFR est non-AA >60 03/22/2018 03:21 AM    Calcium 8.3 (L) 03/22/2018 03:21 AM         IMPRESSION/PLAN:  Jessica Falk is a 64 y.o. female with a diagnosis of stage IA, grade 3, endometrial cancer, with mixed serous and endometrioid histology. She underwent surgical resection followed by pelvic radiation therapy. She has no evidence of disease on today. We will plan on seeing her back in 6 months for continued surveillance. An electronic signature was used to sign this note.     Carly Jo MD  02/11/21

## 2021-02-11 ENCOUNTER — OFFICE VISIT (OUTPATIENT)
Dept: GYNECOLOGY | Age: 61
End: 2021-02-11
Payer: OTHER GOVERNMENT

## 2021-02-11 VITALS
HEART RATE: 76 BPM | DIASTOLIC BLOOD PRESSURE: 89 MMHG | HEIGHT: 59 IN | WEIGHT: 144 LBS | BODY MASS INDEX: 29.03 KG/M2 | SYSTOLIC BLOOD PRESSURE: 134 MMHG

## 2021-02-11 DIAGNOSIS — C54.1 ENDOMETRIAL CANCER (HCC): Primary | ICD-10-CM

## 2021-02-11 PROCEDURE — 99214 OFFICE O/P EST MOD 30 MIN: CPT | Performed by: OBSTETRICS & GYNECOLOGY

## 2021-08-11 NOTE — PROGRESS NOTES
Six month check up. Pt states no abnormal spotting, bleeding or pain. 1. Have you been to the ER, urgent care clinic since your last visit? Hospitalized since your last visit?no  2. Have you seen or consulted any other health care providers outside of the [address] [place] [address] since your last visit? Include any pap smears or colon screening.  no

## 2021-08-11 NOTE — PROGRESS NOTES
[address] [facility] [address], [address]ELSON [phone]  F [phone]    Office Note  Patient ID:  Name:  Sammie Pedro  MRN:  [id number]  :  1960/60 y.o. Date:  2021      HISTORY OF PRESENT ILLNESS:  Svetlana Bennett is a 56 y.o.  postmenopausal female who is an established patient that was referred to me for a diagnosis of endometrial cancer by Dr. Ruthann Kingston in [facility]. On 3/21/18, I took her to the OR for a robotic assisted TLH, BSO, PLND. 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 Postoperatively she was treated with pelvic radiation therapy, which she received in [facility]. She presents today for follow-up surveillance. She is doing well and is without complaints. She denies any vaginal bleeding or discharge, any pelvic or abdominal pain, or any changes in her bowel or bladder habits. ROS:   and GI review:  Negative  Cardiopulmonary review:  Negative   Musculoskeletal:  Negative    A comprehensive review of systems was negative except for that written in the History of Present Illness. , 10 point ROS        Problem List:  Patient Active Problem List    Diagnosis Date Noted    Endometrial cancer ([facility] [address] 2018     PMH:  Past Medical History:   Diagnosis Date    Arthritis     Cancer ([facility] [address] 2018    ENDOMETRIAL    Diverticulosis     High cholesterol     Kidney stones     Psychiatric disorder     DEPRESSION      PSH:  Past Surgical History:   Procedure Laterality Date    HX BREAST LUMPECTOMY      bilateral lump removal-BENIGN    HX COLONOSCOPY  2019    Diverticulosis    HX OTHER SURGICAL      RIGHT ARM LUMPECTOMY    HX OTHER SURGICAL      KIDNEY STONES REMOVED    HX TONSILLECTOMY        Social History:  Social History     Tobacco Use    Smoking status: Never Smoker    Smokeless tobacco: Never Used   Substance Use Topics    Alcohol use: Yes     Comment: RARELY      Family History:  Family History   Problem Relation Age of Onset    Diabetes Mother     Heart Disease Mother     Elevated Lipids Mother     Hypertension Mother     Prostate Cancer Father     Cancer Father         PROSTATE    Hypertension Father     Diabetes Sister     Hypertension Brother     Heart Disease Brother     Diabetes Sister         PRE    No Known Problems Son     No Known Problems Son     No Known Problems Daughter     Anesth Problems Neg Hx       Medications: (reviewed)  Current Outpatient Medications   Medication Sig    lipase-protease-amylase (ZENPEP) 40,000-126,000- 168,000 unit cpDR capsule Take  by mouth.  diclofenac EC (VOLTAREN) 50 mg EC tablet Take  by mouth two (2) times a day.  magnesium oxide (MAG-OX) 400 mg tablet Take 400 mg by mouth daily (after lunch).  calcium-cholecalciferol, d3, (CALCIUM 600 + D) 600-125 mg-unit tab Take  by mouth two (2) times a day.  pravastatin (PRAVACHOL) 10 mg tablet Take  by mouth nightly.  sertraline (ZOLOFT) 100 mg tablet Take  by mouth daily (after lunch). No current facility-administered medications for this visit. Allergies: (reviewed)  No Known Allergies       OBJECTIVE:    Physical Exam:  VITAL SIGNS: Vitals:    08/12/21 1011   BP: 135/87   Pulse: 80   Weight: 143 lb (64.9 kg)   Height: 4' 11.02\" (1.499 m)     Body mass index is 28.87 kg/m². GENERAL RUSS: Conversant, alert, oriented. No acute distress. HEENT: HEENT. No thyroid enlargement. No JVD. Neck: Supple without restrictions. RESPIRATORY: Clear to auscultation and percussion to the bases. No CVAT. CARDIOVASC: RRR without murmur/rub.    GASTROINT: soft, non-tender, without masses or organomegaly   MUSCULOSKEL: no joint tenderness, deformity or swelling   EXTREMITIES: extremities normal, atraumatic, no cyanosis or edema   PELVIC: External genitalia: normal general appearance  Urinary system: urethral meatus normal  Vaginal: normal without tenderness, induration or masses  Cervix: absent  Adnexa: removed surgically  Uterus: absent   RECTAL: Deferred   GURMEET SURVEY: No suspicious lymphadenopathy or edema noted. NEURO: Grossly intact. No acute deficit. Lab Results   Component Value Date/Time    WBC 14.4 (H) 03/22/2018 03:21 AM    HGB 12.3 03/22/2018 03:21 AM    HCT 37.7 03/22/2018 03:21 AM    PLATELET 551 95/36/5248 03:21 AM    MCV 87.5 03/22/2018 03:21 AM     Lab Results   Component Value Date/Time    Sodium 140 03/22/2018 03:21 AM    Potassium 4.7 03/22/2018 03:21 AM    Chloride 110 (H) 03/22/2018 03:21 AM    CO2 18 (L) 03/22/2018 03:21 AM    Anion gap 12 03/22/2018 03:21 AM    Glucose 106 (H) 03/22/2018 03:21 AM    BUN 10 03/22/2018 03:21 AM    Creatinine 0.94 03/22/2018 03:21 AM    BUN/Creatinine ratio 11 (L) 03/22/2018 03:21 AM    GFR est AA >60 03/22/2018 03:21 AM    GFR est non-AA >60 03/22/2018 03:21 AM    Calcium 8.3 (L) 03/22/2018 03:21 AM         IMPRESSION/PLAN:  Akanksha Leon is a 64 y.o. female with a diagnosis of stage IA, grade 3, endometrial cancer, with mixed serous and endometrioid histology. She underwent surgical resection followed by pelvic radiation therapy. She has no evidence of disease on today. We will plan on seeing her back in 6 months for continued surveillance. An electronic signature was used to sign this note.     Deyanira Oquendo MD  08/12/21

## 2021-08-12 ENCOUNTER — OFFICE VISIT (OUTPATIENT)
Dept: GYNECOLOGY | Age: 61
End: 2021-08-12
Payer: OTHER GOVERNMENT

## 2021-08-12 VITALS
HEIGHT: 59 IN | SYSTOLIC BLOOD PRESSURE: 135 MMHG | HEART RATE: 80 BPM | DIASTOLIC BLOOD PRESSURE: 87 MMHG | WEIGHT: 143 LBS | BODY MASS INDEX: 28.83 KG/M2

## 2021-08-12 DIAGNOSIS — C54.1 ENDOMETRIAL CANCER (HCC): Primary | ICD-10-CM

## 2021-08-12 PROCEDURE — 99213 OFFICE O/P EST LOW 20 MIN: CPT | Performed by: OBSTETRICS & GYNECOLOGY

## 2022-02-09 NOTE — PROGRESS NOTES
Six month check up for history of endometrial cancer. Pt states no abnormal spotting, bleeding or pain. 1. Have you been to the ER, urgent care clinic since your last visit? Hospitalized since your last visit?no  2. Have you seen or consulted any other health care providers outside of the [address] [place] [address] since your last visit? Include any pap smears or colon screening.  no

## 2022-02-10 ENCOUNTER — OFFICE VISIT (OUTPATIENT)
Dept: GYNECOLOGY | Age: 62
End: 2022-02-10
Payer: OTHER GOVERNMENT

## 2022-02-10 VITALS
DIASTOLIC BLOOD PRESSURE: 87 MMHG | SYSTOLIC BLOOD PRESSURE: 135 MMHG | BODY MASS INDEX: 30.85 KG/M2 | HEIGHT: 57 IN | WEIGHT: 143 LBS | HEART RATE: 99 BPM

## 2022-02-10 DIAGNOSIS — C54.1 ENDOMETRIAL CANCER (HCC): Primary | ICD-10-CM

## 2022-02-10 PROCEDURE — 99213 OFFICE O/P EST LOW 20 MIN: CPT | Performed by: OBSTETRICS & GYNECOLOGY

## 2022-02-10 NOTE — PROGRESS NOTES
[address] [place] [address] [address], [address] [facility]  P [phone]  F [phone]    Office Note  Patient ID:  Name:  Karmen Ochoa  MRN:  [id number]  :  1960/61 y.o. Date:  2/10/2022      HISTORY OF PRESENT ILLNESS:  Yomi Black is a 56 y.o.  postmenopausal female who is an established patient that was referred to me for a diagnosis of endometrial cancer by Dr. Lamberto Castro in [facility]. On 3/21/18, I took her to the OR for a robotic assisted TLH, BSO, PLND. 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 Postoperatively she was treated with pelvic radiation therapy, which she received in [facility]. She presents today for follow-up surveillance. She is doing well and is without complaints. She denies any vaginal bleeding or discharge, any pelvic or abdominal pain, or any changes in her bowel or bladder habits. ROS:   and GI review:  Negative  Cardiopulmonary review:  Negative   Musculoskeletal:  Negative    A comprehensive review of systems was negative except for that written in the History of Present Illness. , 10 point ROS        Problem List:  Patient Active Problem List    Diagnosis Date Noted    Endometrial cancer ([facility] [address] 2018     PMH:  Past Medical History:   Diagnosis Date    Arthritis     Cancer ([facility] [address] 2018    ENDOMETRIAL    Diverticulosis     High cholesterol     Kidney stones     Psychiatric disorder     DEPRESSION      PSH:  Past Surgical History:   Procedure Laterality Date    HX BREAST LUMPECTOMY      bilateral lump removal-BENIGN    HX COLONOSCOPY  2019    Diverticulosis    HX OTHER SURGICAL      RIGHT ARM LUMPECTOMY    HX OTHER SURGICAL      KIDNEY STONES REMOVED    HX TONSILLECTOMY        Social History:  Social History     Tobacco Use    Smoking status: Never Smoker    Smokeless tobacco: Never Used   Substance Use Topics    Alcohol use: Yes     Comment: RARELY      Family History:  Family History   Problem Relation Age of Onset    Diabetes Mother     Heart Disease Mother     Elevated Lipids Mother     Hypertension Mother     Prostate Cancer Father     Cancer Father         PROSTATE    Hypertension Father     Diabetes Sister     Hypertension Brother     Heart Disease Brother     Pancreatic Cancer Brother     Diabetes Sister         PRE    No Known Problems Son     No Known Problems Son     No Known Problems Daughter     Anesth Problems Neg Hx       Medications: (reviewed)  Current Outpatient Medications   Medication Sig    lipase-protease-amylase (ZENPEP) 40,000-126,000- 168,000 unit cpDR capsule Take  by mouth.  diclofenac EC (VOLTAREN) 50 mg EC tablet Take  by mouth two (2) times a day.  magnesium oxide (MAG-OX) 400 mg tablet Take 400 mg by mouth daily (after lunch).  calcium-cholecalciferol, d3, (CALCIUM 600 + D) 600-125 mg-unit tab Take  by mouth two (2) times a day.  pravastatin (PRAVACHOL) 10 mg tablet Take  by mouth nightly.  sertraline (ZOLOFT) 100 mg tablet Take  by mouth daily (after lunch). No current facility-administered medications for this visit. Allergies: (reviewed)  No Known Allergies       OBJECTIVE:    Physical Exam:  VITAL SIGNS: Vitals:    02/10/22 1015   BP: 135/87   Pulse: 99   Weight: 143 lb (64.9 kg)   Height: 4' 9.05\" (1.449 m)     Body mass index is 30.89 kg/m². GENERAL RUSS: Conversant, alert, oriented. No acute distress. HEENT: HEENT. No thyroid enlargement. No JVD. Neck: Supple without restrictions. RESPIRATORY: Clear to auscultation and percussion to the bases. No CVAT. CARDIOVASC: RRR without murmur/rub.    GASTROINT: soft, non-tender, without masses or organomegaly   MUSCULOSKEL: no joint tenderness, deformity or swelling   EXTREMITIES: extremities normal, atraumatic, no cyanosis or edema   PELVIC: External genitalia: normal general appearance  Urinary system: urethral meatus normal  Vaginal: normal without tenderness, induration or masses  Cervix: absent  Adnexa: removed surgically  Uterus: absent   RECTAL: Deferred   GURMEET SURVEY: No suspicious lymphadenopathy or edema noted. NEURO: Grossly intact. No acute deficit. Lab Results   Component Value Date/Time    WBC 14.4 (H) 03/22/2018 03:21 AM    HGB 12.3 03/22/2018 03:21 AM    HCT 37.7 03/22/2018 03:21 AM    PLATELET 982 03/30/9387 03:21 AM    MCV 87.5 03/22/2018 03:21 AM     Lab Results   Component Value Date/Time    Sodium 140 03/22/2018 03:21 AM    Potassium 4.7 03/22/2018 03:21 AM    Chloride 110 (H) 03/22/2018 03:21 AM    CO2 18 (L) 03/22/2018 03:21 AM    Anion gap 12 03/22/2018 03:21 AM    Glucose 106 (H) 03/22/2018 03:21 AM    BUN 10 03/22/2018 03:21 AM    Creatinine 0.94 03/22/2018 03:21 AM    BUN/Creatinine ratio 11 (L) 03/22/2018 03:21 AM    GFR est AA >60 03/22/2018 03:21 AM    GFR est non-AA >60 03/22/2018 03:21 AM    Calcium 8.3 (L) 03/22/2018 03:21 AM         IMPRESSION/PLAN:  Rolando Gar is a 63 y.o. female with a diagnosis of stage IA, grade 3, endometrial cancer, with mixed serous and endometrioid histology. She underwent surgical resection followed by pelvic radiation therapy. She has no evidence of disease on today. We will plan on seeing her back in 6 months for continued surveillance. An electronic signature was used to sign this note.     Bethanie Dickinson MD  02/10/22

## 2022-03-18 PROBLEM — C54.1 ENDOMETRIAL CANCER (HCC): Status: ACTIVE | Noted: 2018-03-12

## 2022-08-11 ENCOUNTER — OFFICE VISIT (OUTPATIENT)
Dept: GYNECOLOGY | Age: 62
End: 2022-08-11
Payer: OTHER GOVERNMENT

## 2022-08-11 VITALS
WEIGHT: 144 LBS | DIASTOLIC BLOOD PRESSURE: 75 MMHG | SYSTOLIC BLOOD PRESSURE: 123 MMHG | HEIGHT: 59 IN | BODY MASS INDEX: 29.03 KG/M2 | HEART RATE: 75 BPM

## 2022-08-11 DIAGNOSIS — C54.1 ENDOMETRIAL CANCER (HCC): Primary | ICD-10-CM

## 2022-08-11 PROCEDURE — 99213 OFFICE O/P EST LOW 20 MIN: CPT | Performed by: OBSTETRICS & GYNECOLOGY

## 2022-08-11 NOTE — PROGRESS NOTES
[address] [place] [address] Mathias Moritz [address], [address]  P [phone]  F [phone]    Office Note  Patient ID:  Name:  Alexandra Welsh  MRN:  [id number]  :  1960/61 y.o. Date:  2022      HISTORY OF PRESENT ILLNESS:  Beck Obrien is a 56 y.o.  postmenopausal female who is an established patient that was referred to me for a diagnosis of endometrial cancer by Dr. Nohemy Van in [facility]. On 3/21/18, I took her to the OR for a robotic assisted TLH, BSO, PLND. 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 Postoperatively she was treated with pelvic radiation therapy, which she received in [facility]. She presents today for follow-up surveillance. She is doing well and is without complaints. She denies any vaginal bleeding or discharge, any pelvic or abdominal pain, or any changes in her bowel or bladder habits. ROS:   and GI review:  Negative  Cardiopulmonary review:  Negative   Musculoskeletal:  Negative    A comprehensive review of systems was negative except for that written in the History of Present Illness. , 10 point ROS        Problem List:  Patient Active Problem List    Diagnosis Date Noted    Endometrial cancer ([facility] 2018     PMH:  Past Medical History:   Diagnosis Date    Arthritis     Cancer ([facility] [address] 2018    ENDOMETRIAL    Diverticulosis     High cholesterol     Kidney stones     Psychiatric disorder     DEPRESSION      PSH:  Past Surgical History:   Procedure Laterality Date    HX BREAST LUMPECTOMY      bilateral lump removal-BENIGN    HX COLONOSCOPY  2019    Diverticulosis    HX OTHER SURGICAL      RIGHT ARM LUMPECTOMY    HX OTHER SURGICAL      KIDNEY STONES REMOVED    HX TONSILLECTOMY        Social History:  Social History     Tobacco Use    Smoking status: Never    Smokeless tobacco: Never   Substance Use Topics    Alcohol use:  Yes Comment: RARELY      Family History:  Family History   Problem Relation Age of Onset    Diabetes Mother     Heart Disease Mother     Elevated Lipids Mother     Hypertension Mother     Prostate Cancer Father     Cancer Father         PROSTATE    Hypertension Father     Diabetes Sister     Hypertension Brother     Heart Disease Brother     Pancreatic Cancer Brother     Diabetes Sister         PRE    No Known Problems Son     No Known Problems Son     No Known Problems Daughter     Anesth Problems Neg Hx       Medications: (reviewed)  Current Outpatient Medications   Medication Sig    lipase-protease-amylase (Reinaldo Rao 40,000) 40,000-126,000- 168,000 unit cpDR capsule Take  by mouth. diclofenac EC (VOLTAREN) 50 mg EC tablet Take  by mouth two (2) times a day. magnesium oxide (MAG-OX) 400 mg tablet Take 400 mg by mouth daily (after lunch). calcium-cholecalciferol, d3, 600-125 mg-unit tab Take  by mouth two (2) times a day. pravastatin (PRAVACHOL) 10 mg tablet Take  by mouth nightly. sertraline (ZOLOFT) 100 mg tablet Take  by mouth daily (after lunch). No current facility-administered medications for this visit. Allergies: (reviewed)  No Known Allergies       OBJECTIVE:    Physical Exam:  VITAL SIGNS: Vitals:    08/11/22 1007   BP: 123/75   Pulse: 75   Weight: 144 lb (65.3 kg)   Height: 4' 11.02\" (1.499 m)       Body mass index is 29.07 kg/m². GENERAL RUSS: Conversant, alert, oriented. No acute distress. HEENT: HEENT. No thyroid enlargement. No JVD. Neck: Supple without restrictions. RESPIRATORY: Clear to auscultation and percussion to the bases. No CVAT. CARDIOVASC: RRR without murmur/rub.    GASTROINT: soft, non-tender, without masses or organomegaly   MUSCULOSKEL: no joint tenderness, deformity or swelling   EXTREMITIES: extremities normal, atraumatic, no cyanosis or edema   PELVIC: External genitalia: normal general appearance  Urinary system: urethral meatus normal  Vaginal: normal without tenderness, induration or masses  Cervix: absent  Adnexa: removed surgically  Uterus: absent   RECTAL: Deferred   GURMEET SURVEY: No suspicious lymphadenopathy or edema noted. NEURO: Grossly intact. No acute deficit. Lab Results   Component Value Date/Time    WBC 14.4 (H) 03/22/2018 03:21 AM    HGB 12.3 03/22/2018 03:21 AM    HCT 37.7 03/22/2018 03:21 AM    PLATELET 179 28/30/1322 03:21 AM    MCV 87.5 03/22/2018 03:21 AM     Lab Results   Component Value Date/Time    Sodium 140 03/22/2018 03:21 AM    Potassium 4.7 03/22/2018 03:21 AM    Chloride 110 (H) 03/22/2018 03:21 AM    CO2 18 (L) 03/22/2018 03:21 AM    Anion gap 12 03/22/2018 03:21 AM    Glucose 106 (H) 03/22/2018 03:21 AM    BUN 10 03/22/2018 03:21 AM    Creatinine 0.94 03/22/2018 03:21 AM    BUN/Creatinine ratio 11 (L) 03/22/2018 03:21 AM    GFR est AA >60 03/22/2018 03:21 AM    GFR est non-AA >60 03/22/2018 03:21 AM    Calcium 8.3 (L) 03/22/2018 03:21 AM         IMPRESSION/PLAN:  Ermelinda Chaudhari is a 63 y.o. female with a diagnosis of stage IA, grade 3, endometrial cancer, with mixed serous and endometrioid histology. She underwent surgical resection followed by pelvic radiation therapy. She has no evidence of disease on today. We will plan on seeing her back in 6 months for continued surveillance. An electronic signature was used to sign this note.     Shiva Collins MD  08/11/22

## 2022-08-12 LAB — CANCER AG125 SERPL-ACNC: 7.3 U/ML (ref 0–38.1)

## 2023-02-13 NOTE — PROGRESS NOTES
Six month check up for history of endometrial cancer. Pt states no abnormal spotting, bleeding or pain. 1. Have you been to the ER, urgent care clinic since your last visit? Hospitalized since your last visit?aortal aneurysm. 2. Have you seen or consulted any other health care providers outside of the [address] [place] [address] since your last visit? Include any pap smears or colon screening.  no

## 2023-02-14 ENCOUNTER — OFFICE VISIT (OUTPATIENT)
Dept: GYNECOLOGY | Age: 63
End: 2023-02-14
Payer: OTHER GOVERNMENT

## 2023-02-14 VITALS
DIASTOLIC BLOOD PRESSURE: 79 MMHG | WEIGHT: 149 LBS | HEIGHT: 59 IN | HEART RATE: 80 BPM | BODY MASS INDEX: 30.04 KG/M2 | SYSTOLIC BLOOD PRESSURE: 113 MMHG

## 2023-02-14 DIAGNOSIS — C54.1 ENDOMETRIAL CANCER (HCC): Primary | ICD-10-CM

## 2023-02-14 PROCEDURE — 99213 OFFICE O/P EST LOW 20 MIN: CPT | Performed by: OBSTETRICS & GYNECOLOGY

## 2023-02-14 RX ORDER — LISINOPRIL 10 MG/1
TABLET ORAL DAILY
COMMUNITY

## 2023-02-14 RX ORDER — ROSUVASTATIN CALCIUM 5 MG/1
TABLET, COATED ORAL
COMMUNITY

## 2023-02-14 NOTE — PROGRESS NOTES
[address] [place] [address] [address]ias [address], [address] [facility]  P [phone]  F [phone]    Office Note  Patient ID:  Name:  Janie Hardy  MRN:  [id number]  :  1960/62 y.o. Date:  2023      HISTORY OF PRESENT ILLNESS:  Hamida Riggins is a 56 y.o.  postmenopausal female who is an established patient that was referred to me for a diagnosis of endometrial cancer by Dr. Gita Blankenship in [facility]. On 3/21/18, I took her to the OR for a robotic assisted TLH, BSO, PLND. 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 Postoperatively she was treated with pelvic radiation therapy, which she received in [facility]. She presents today for follow-up surveillance. She is doing well and is without complaints. She denies any vaginal bleeding or discharge, any pelvic or abdominal pain, or any changes in her bowel or bladder habits. ROS:   and GI review:  Negative  Cardiopulmonary review:  Negative   Musculoskeletal:  Negative    A comprehensive review of systems was negative except for that written in the History of Present Illness. , 10 point ROS        Problem List:  Patient Active Problem List    Diagnosis Date Noted    Endometrial cancer ([address] 2018     PMH:  Past Medical History:   Diagnosis Date    Aneurysm of aorta ([facility] [address]     Arthritis     Cancer ([facility] [address] 2018    ENDOMETRIAL    Diverticulosis     Heart murmur     High cholesterol     Kidney stones     Psychiatric disorder     DEPRESSION      PSH:  Past Surgical History:   Procedure Laterality Date    HX BREAST LUMPECTOMY      bilateral lump removal-BENIGN    HX COLONOSCOPY  2019    Diverticulosis    HX OTHER SURGICAL      RIGHT ARM LUMPECTOMY    HX OTHER SURGICAL      KIDNEY STONES REMOVED    HX TONSILLECTOMY        Social History:  Social History     Tobacco Use    Smoking status: Never    Smokeless tobacco: Never Substance Use Topics    Alcohol use: Yes     Comment: RARELY      Family History:  Family History   Problem Relation Age of Onset    Diabetes Mother     Heart Disease Mother     Elevated Lipids Mother     Hypertension Mother     Prostate Cancer Father     Cancer Father         PROSTATE    Hypertension Father     Diabetes Sister     Hypertension Brother     Heart Disease Brother     Pancreatic Cancer Brother     Diabetes Sister         PRE    No Known Problems Son     No Known Problems Son     No Known Problems Daughter     Anesth Problems Neg Hx       Medications: (reviewed)  Current Outpatient Medications   Medication Sig    rosuvastatin (CRESTOR) 5 mg tablet Take  by mouth nightly. lisinopriL (PRINIVIL, ZESTRIL) 10 mg tablet Take  by mouth daily. lipase-protease-amylase (ZENPEP 40,000) 40,000-126,000- 168,000 unit cpDR capsule Take  by mouth. diclofenac EC (VOLTAREN) 50 mg EC tablet Take  by mouth two (2) times a day. magnesium oxide (MAG-OX) 400 mg tablet Take 400 mg by mouth daily (after lunch). calcium-cholecalciferol, d3, 600-125 mg-unit tab Take  by mouth two (2) times a day. sertraline (ZOLOFT) 100 mg tablet Take  by mouth daily (after lunch). pravastatin (PRAVACHOL) 10 mg tablet Take  by mouth nightly. (Patient not taking: Reported on 2/14/2023)     No current facility-administered medications for this visit. Allergies: (reviewed)  No Known Allergies       OBJECTIVE:    Physical Exam:  VITAL SIGNS: Vitals:    02/14/23 0954   BP: 113/79   Pulse: 80   Weight: 149 lb (67.6 kg)   Height: 4' 11.02\" (1.499 m)         Body mass index is 30.08 kg/m². GENERAL RUSS: Conversant, alert, oriented. No acute distress. HEENT: HEENT. No thyroid enlargement. No JVD. Neck: Supple without restrictions. RESPIRATORY: Clear to auscultation and percussion to the bases. No CVAT. CARDIOVASC: RRR without murmur/rub.    GASTROINT: soft, non-tender, without masses or organomegaly   MUSCULOSKEL: no joint tenderness, deformity or swelling   EXTREMITIES: extremities normal, atraumatic, no cyanosis or edema   PELVIC: External genitalia: normal general appearance  Urinary system: urethral meatus normal  Vaginal: normal without tenderness, induration or masses  Cervix: absent  Adnexa: removed surgically  Uterus: absent   RECTAL: Deferred   GURMEET SURVEY: No suspicious lymphadenopathy or edema noted. NEURO: Grossly intact. No acute deficit. Lab Results   Component Value Date/Time    WBC 14.4 (H) 03/22/2018 03:21 AM    HGB 12.3 03/22/2018 03:21 AM    HCT 37.7 03/22/2018 03:21 AM    PLATELET 449 37/10/2653 03:21 AM    MCV 87.5 03/22/2018 03:21 AM     Lab Results   Component Value Date/Time    Sodium 140 03/22/2018 03:21 AM    Potassium 4.7 03/22/2018 03:21 AM    Chloride 110 (H) 03/22/2018 03:21 AM    CO2 18 (L) 03/22/2018 03:21 AM    Anion gap 12 03/22/2018 03:21 AM    Glucose 106 (H) 03/22/2018 03:21 AM    BUN 10 03/22/2018 03:21 AM    Creatinine 0.94 03/22/2018 03:21 AM    BUN/Creatinine ratio 11 (L) 03/22/2018 03:21 AM    GFR est AA >60 03/22/2018 03:21 AM    GFR est non-AA >60 03/22/2018 03:21 AM    Calcium 8.3 (L) 03/22/2018 03:21 AM         IMPRESSION/PLAN:  Oz Hoff is a 63 y.o. female with a diagnosis of stage IA, grade 3, endometrial cancer, with mixed serous and endometrioid histology. She underwent surgical resection followed by pelvic radiation therapy. She has no evidence of disease on today. We will plan on seeing her back on an annual basis for continued surveillance. An electronic signature was used to sign this note.     Antony Barnes MD  02/14/23

## 2023-05-18 RX ORDER — ROSUVASTATIN CALCIUM 5 MG/1
TABLET, COATED ORAL
COMMUNITY

## 2023-05-18 RX ORDER — PRAVASTATIN SODIUM 10 MG
TABLET ORAL
COMMUNITY

## 2023-05-18 RX ORDER — SERTRALINE HYDROCHLORIDE 100 MG/1
TABLET, FILM COATED ORAL
COMMUNITY

## 2023-05-18 RX ORDER — MAGNESIUM OXIDE 400 MG/1
400 TABLET ORAL
COMMUNITY

## 2023-05-18 RX ORDER — LISINOPRIL 10 MG/1
TABLET ORAL DAILY
COMMUNITY

## 2024-02-15 ENCOUNTER — OFFICE VISIT (OUTPATIENT)
Age: 64
End: 2024-02-15
Payer: OTHER GOVERNMENT

## 2024-02-15 VITALS
SYSTOLIC BLOOD PRESSURE: 136 MMHG | BODY MASS INDEX: 30.04 KG/M2 | WEIGHT: 149 LBS | HEART RATE: 75 BPM | DIASTOLIC BLOOD PRESSURE: 85 MMHG | HEIGHT: 59 IN

## 2024-02-15 DIAGNOSIS — Z85.42 HISTORY OF ENDOMETRIAL CANCER: Primary | ICD-10-CM

## 2024-02-15 PROCEDURE — 99212 OFFICE O/P EST SF 10 MIN: CPT | Performed by: OBSTETRICS & GYNECOLOGY

## 2024-02-15 RX ORDER — AMLODIPINE BESYLATE 2.5 MG/1
2.5 TABLET ORAL DAILY
COMMUNITY
Start: 2023-11-27

## 2024-02-15 NOTE — PROGRESS NOTES
Check up. Pt states no abnormal spotting, bleeding or pain.  1. Have you been to the ER, urgent care clinic since your last visit?  Hospitalized since your last visit?no    2. Have you seen or consulted any other health care providers outside of the [facility] since your last visit?  Include any pap smears or colon screening. Mammogram in January and being followed for a cyst in right breast.   Vitals:    02/15/24 1014 02/15/24 1022   BP: (!) 130/90 136/85   Site: Right Upper Arm Left Upper Arm   Position: Sitting Sitting   Pulse: 76 75   Weight: 67.6 kg (149 lb)    Height: 1.499 m (4' 11.02\")       
  MUSCULOSKEL: no joint tenderness, deformity or swelling   EXTREMITIES: extremities normal, atraumatic, no cyanosis or edema   PELVIC: Normal external genitalia.  Normal vagina.  Uterus, cervix, and adnexa surgically absent.    RECTAL: Deferred   FRANCE SURVEY: No suspicious lymphadenopathy or edema noted.   NEURO: Grossly intact. No acute deficit.       Lab Data:    No results found for: \"WBC\", \"HGB\", \"HCT\", \"PLT\", \"MCV\"  No results found for: \"NA\", \"K\", \"CL\", \"CO2\", \"AGAP\", \"GLU\", \"BUN\", \"CREA\", \"GFRAA\", \"CA\"      CT of abdomen/pelvis (1/10/20) - [facility]  Mild dependent atelectasis     The liver is normal. Cholelithiasis. No intra or extrahepatic biliary duct dilatation. The hepatic and portal veins are patent. The spleen, pancreas, and adrenal glands are normal. The kidneys enhance symmetrically and are normal. Mild asymmetric dilatation of the right ureter along its entire course, without hydronephrosis     Fat-containing umbilical hernia. No evidence of bowel obstruction. Questionable minimal mucosal hyperemia of distal ileum, though incomplete distention of the bowel makes definitive evaluation difficult No free intraperitoneal air. Mild pelvic ascites.     No abdominal or pelvic lymphadenopathy.     Major abdominal vessels are patent. The abdominal aorta is normal in caliber.     Normal urinary bladder.     Post hysterectomy.     No suspicious osseous lesions.     IMPRESSION:   Questionable minimal mucosal hyperemia of the terminal ileum with minimal pelvic free fluid, which may reflect mild enteritis.       IMPRESSION/PLAN:  Hazel Simmons is a 63 y.o. female with a histsory of stage IA, grade 3, endometrial cancer, with mixed serous and endometrioid histology. She underwent surgical resection followed by pelvic radiation therapy. She has no evidence of disease on today. We will plan on seeing her back on an annual basis for continued surveillance.       An electronic signature was used to sign

## 2025-02-05 NOTE — PROGRESS NOTES
Check up. Pt states no abnormal spotting, bleeding or pain.  1. Have you been to the ER, urgent care clinic since your last visit?  Hospitalized since your last visit?  no  2. Have you seen or consulted any other health care providers outside of the [facility] since your last visit?  Include any pap smears or colon screening. no

## 2025-02-06 ENCOUNTER — OFFICE VISIT (OUTPATIENT)
Age: 65
End: 2025-02-06
Payer: OTHER GOVERNMENT

## 2025-02-06 VITALS
HEART RATE: 64 BPM | SYSTOLIC BLOOD PRESSURE: 126 MMHG | HEIGHT: 59 IN | DIASTOLIC BLOOD PRESSURE: 85 MMHG | WEIGHT: 146 LBS | BODY MASS INDEX: 29.43 KG/M2

## 2025-02-06 DIAGNOSIS — Z85.42 HISTORY OF ENDOMETRIAL CANCER: Primary | ICD-10-CM

## 2025-02-06 PROCEDURE — 99212 OFFICE O/P EST SF 10 MIN: CPT | Performed by: OBSTETRICS & GYNECOLOGY

## 2025-02-06 ASSESSMENT — PATIENT HEALTH QUESTIONNAIRE - PHQ9
SUM OF ALL RESPONSES TO PHQ QUESTIONS 1-9: 0
SUM OF ALL RESPONSES TO PHQ QUESTIONS 1-9: 0
1. LITTLE INTEREST OR PLEASURE IN DOING THINGS: NOT AT ALL
SUM OF ALL RESPONSES TO PHQ9 QUESTIONS 1 & 2: 0
2. FEELING DOWN, DEPRESSED OR HOPELESS: NOT AT ALL
SUM OF ALL RESPONSES TO PHQ QUESTIONS 1-9: 0
SUM OF ALL RESPONSES TO PHQ QUESTIONS 1-9: 0

## 2025-02-06 NOTE — PROGRESS NOTES
[facility]  [address] [place] [address], [address]  [place] [address]  P [phone]  F [phone]    Office Note  Patient ID:  Name:  Hazel Simmons  MRN:  [id number]  :  1960/64 y.o.  Date:  2025      HISTORY OF PRESENT ILLNESS:  Hazel Simmons is a 64 y.o.  postmenopausal female who is an established patient that was referred to me for a diagnosis of endometrial cancer by Dr. Marge Genao in [place].  On 3/21/18, I took her to the OR for a robotic assisted TLH, BSO, PLND.   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  Postoperatively she was treated with pelvic radiation therapy, which she received in [place].     She presents today for follow-up surveillance.  She is doing well and is without complaints.  She denies any vaginal bleeding or discharge, any pelvic or abdominal pain, or any changes in her bowel or bladder habits.         ROS:   and GI review:  Negative  Cardiopulmonary review:  Negative   Musculoskeletal:  Negative     A comprehensive review of systems was negative except for that written in the History of Present Illness., 10 point ROS      Problem List:  Patient Active Problem List    Diagnosis Date Noted    Endometrial cancer (HCC) 2018     PMH:  Past Medical History:   Diagnosis Date    Aneurysm of aorta (HCC)     Arthritis     Breast cyst, right     Cancer ([facility]) 2018    ENDOMETRIAL    Diverticulosis     Heart murmur     High cholesterol     Kidney stones     Psychiatric disorder     DEPRESSION      PSH:  Past Surgical History:   Procedure Laterality Date    BREAST LUMPECTOMY      bilateral lump removal-BENIGN    COLONOSCOPY  2019    Diverticulosis    OTHER SURGICAL HISTORY      RIGHT ARM LUMPECTOMY    OTHER SURGICAL HISTORY      KIDNEY STONES REMOVED    TONSILLECTOMY        Social History:  Social History     Tobacco Use    Smoking status: Never

## 2025-02-10 ENCOUNTER — CLINICAL DOCUMENTATION (OUTPATIENT)
Age: 65
End: 2025-02-10

## 2025-02-10 NOTE — PROGRESS NOTES
NCCN Distress Thermometer    [facility] Gynecologic Oncology    Date Screening Completed: 2/6/25    Screening Declined:  [] Yes    Number that best describes how much distress you've experienced in the past week, including today?  0 [x] - No distress 1 []      2 []      3 []      4 []       5 []       6 []      7 []      8 []      9 []       10 [] - Extreme distress    PROBLEM LIST  Have you had concerns about any of the items below in the past week, including today?      Physical Concerns Practical Concerns   [] Pain [] Taking care of myself    [] Sleep [] Taking care of others    [] Fatigue [] Safety   [] Tobacco use  [] Work   [] Substance use  [] School   [] Memory or concentration [] Housing/Utilities   [] Sexual health [] Finances   [] Changes in eating  [] Insurance   [] Loss or change of physical abilities  [] Transportation    []    Emotional Concerns [] Having enough food   [] Worry or anxiety [] Access to medicine   [] Sadness or depression [] Treatment decisions   [] Loss of interest or enjoyment     [] Grief or loss  Spiritual or Yarsanism Concerns   [] Fear [] Sense of meaning or purpose   [] Loneliness  [] Changes in sharron or beliefs   [] Anger [] Death, dying, or afterlife   [] Changes in appearance [] Conflict between beliefs and cancer treatments    [] Feelings of worthlessness or being a burden [] Relationship with the sacred    [] Ritual or dietary needs    Social Concerns     [] Relationship with spouse or partner     [] Relationship with children    [] Relationship with family members     [] Relationship with friends or coworkers     [] Communication with health care team     [] Ability to have children     [] Prejudice or discrimination        Other Concerns: n/a

## (undated) DEVICE — DERMABOND SKIN ADH 0.7ML -- DERMABOND ADVANCED 12/BX

## (undated) DEVICE — NEEDLE SPNL 22GA L3.5IN BLK HUB S STL REG WALL FIT STYL W/

## (undated) DEVICE — INFECTION CONTROL KIT SYS

## (undated) DEVICE — SCISSORS SURG DIA8MM MPLR CRV ENDOWRIST

## (undated) DEVICE — BLADELESS OBTURATOR: Brand: WECK VISTA

## (undated) DEVICE — Device

## (undated) DEVICE — PROGRASP FORCEPS: Brand: ENDOWRIST

## (undated) DEVICE — TRAY PREP DRY W/ PREM GLV 2 APPL 6 SPNG 2 UNDPD 1 OVERWRAP

## (undated) DEVICE — FENESTRATED BIPOLAR FORCEPS: Brand: ENDOWRIST

## (undated) DEVICE — GLOVE SURG SZ 75 L1212IN FNGR THK138MIL BRN LTX FREE

## (undated) DEVICE — SUTURE ABSORBABLE MONOFILAMENT 2-0 8 IN ANTIBACT STRATAFIX SXMP1B409

## (undated) DEVICE — ELECTRO LUBE IS A SINGLE PATIENT USE DEVICE THAT IS INTENDED TO BE USED ON ELECTROSURGICAL ELECTRODES TO REDUCE STICKING.: Brand: KEY SURGICAL ELECTRO LUBE

## (undated) DEVICE — BASIC PACK: Brand: CONVERTORS

## (undated) DEVICE — LARGE SUTURE CUT NEEDLE DRIVER: Brand: ENDOWRIST

## (undated) DEVICE — AIRSEAL 8 MM ACCESS PORT AND LOW PROFILE OBTURATOR WITH BLADELESS OPTICAL TIP, 120 MM LENGTH: Brand: AIRSEAL

## (undated) DEVICE — TRI-LUMEN FILTERED TUBE SET WITH ACTIVATED CHARCOAL FILTER: Brand: AIRSEAL

## (undated) DEVICE — CATH FOL TY IC BAG 16FR 2000ML -- CONVERT TO ITEM 363158

## (undated) DEVICE — DEVON™ KNEE AND BODY STRAP 60" X 3" (1.5 M X 7.6 CM): Brand: DEVON

## (undated) DEVICE — TIP COVER ACCESSORY

## (undated) DEVICE — SPECIMEN RETRIEVAL POUCH: Brand: ENDO CATCH GOLD

## (undated) DEVICE — KENDALL SCD EXPRESS SLEEVES, KNEE LENGTH, MEDIUM: Brand: KENDALL SCD

## (undated) DEVICE — 3M™ DURAPORE™ SURGICAL TAPE 1538-3, 3 INCH X 10 YARD (7,5CM X 9,1M), 4 ROLLS/BOX: Brand: 3M™ DURAPORE™

## (undated) DEVICE — (D)PREP SKN CHLRAPRP APPL 26ML -- CONVERT TO ITEM 371833

## (undated) DEVICE — SUTURE MCRYL SZ 4-0 L27IN ABSRB UD L19MM PS-2 1/2 CIR PRIM Y426H

## (undated) DEVICE — VCARE MEDIUM, UTERINE MANIPULATOR, VAGINAL-CERVICAL-AHLUWALIA'S-RETRACTOR-ELEVATOR: Brand: VCARE

## (undated) DEVICE — DRAPE,REIN 53X77,STERILE: Brand: MEDLINE

## (undated) DEVICE — ARM DRAPE

## (undated) DEVICE — PAD SANIT NPKN 4IN GRD

## (undated) DEVICE — SEAL UNIV 5-8MM DISP BX/10 -- DA VINCI XI - SNGL USE

## (undated) DEVICE — SOLUTION IV 1000ML 0.9% SOD CHL

## (undated) DEVICE — VISUALIZATION SYSTEM: Brand: CLEARIFY

## (undated) DEVICE — REM POLYHESIVE ADULT PATIENT RETURN ELECTRODE: Brand: VALLEYLAB

## (undated) DEVICE — NEEDLE INSUF L120MM DIA2MM DISP FOR PNEUMOPERI ENDOPATH

## (undated) DEVICE — SURGICAL PROCEDURE PACK GYN LAPAROSCOPY CUST SMH LF